# Patient Record
Sex: FEMALE | Race: WHITE | NOT HISPANIC OR LATINO | Employment: OTHER | URBAN - METROPOLITAN AREA
[De-identification: names, ages, dates, MRNs, and addresses within clinical notes are randomized per-mention and may not be internally consistent; named-entity substitution may affect disease eponyms.]

---

## 2019-06-06 ENCOUNTER — DOCUMENTATION ONLY (OUTPATIENT)
Dept: OTHER | Facility: CLINIC | Age: 69
End: 2019-06-06

## 2020-09-30 DIAGNOSIS — Z11.59 ENCOUNTER FOR SCREENING FOR OTHER VIRAL DISEASES: Primary | ICD-10-CM

## 2020-09-30 PROBLEM — C54.1 ENDOMETRIAL CARCINOMA (H): Status: ACTIVE | Noted: 2020-09-30

## 2020-09-30 PROBLEM — G47.30 SLEEP APNEA IN ADULT: Status: ACTIVE | Noted: 2020-09-30

## 2020-09-30 PROBLEM — M26.609 TMJ (TEMPOROMANDIBULAR JOINT DISORDER): Status: ACTIVE | Noted: 2020-09-30

## 2020-10-07 ENCOUNTER — TRANSFERRED RECORDS (OUTPATIENT)
Dept: HEALTH INFORMATION MANAGEMENT | Facility: CLINIC | Age: 70
End: 2020-10-07

## 2020-10-12 ENCOUNTER — APPOINTMENT (OUTPATIENT)
Dept: NURSING | Facility: CLINIC | Age: 70
End: 2020-10-12
Payer: COMMERCIAL

## 2020-10-12 DIAGNOSIS — Z01.818 PREOP GENERAL PHYSICAL EXAM: Primary | ICD-10-CM

## 2020-10-12 DIAGNOSIS — Z11.59 ENCOUNTER FOR SCREENING FOR OTHER VIRAL DISEASES: ICD-10-CM

## 2020-10-12 PROCEDURE — 93000 ELECTROCARDIOGRAM COMPLETE: CPT | Performed by: FAMILY MEDICINE

## 2020-10-12 PROCEDURE — U0003 INFECTIOUS AGENT DETECTION BY NUCLEIC ACID (DNA OR RNA); SEVERE ACUTE RESPIRATORY SYNDROME CORONAVIRUS 2 (SARS-COV-2) (CORONAVIRUS DISEASE [COVID-19]), AMPLIFIED PROBE TECHNIQUE, MAKING USE OF HIGH THROUGHPUT TECHNOLOGIES AS DESCRIBED BY CMS-2020-01-R: HCPCS | Performed by: OBSTETRICS & GYNECOLOGY

## 2020-10-13 LAB
SARS-COV-2 RNA SPEC QL NAA+PROBE: NOT DETECTED
SPECIMEN SOURCE: NORMAL

## 2020-10-14 ENCOUNTER — TELEPHONE (OUTPATIENT)
Dept: NURSING | Facility: CLINIC | Age: 70
End: 2020-10-14

## 2020-10-14 NOTE — TELEPHONE ENCOUNTER
Dr. Rubén carrasco. Says she did preop on patient and patient needed an EKG done so she faxed orders over to [facility] to have patient get it done. Needs results faxed to [phone].

## 2020-10-14 NOTE — PROGRESS NOTES
PTA medications updated by Medication Scribe prior to surgery via phone call with patient      -LAST DOSES ENTERED BY NURSE-    Comments:    Medication history sources: Patient, Surescripts, H&P and Patient's home med list  Medication history source reliability: Good  Adherence assessment: N/A Not Observed    Significant changes made to the medication list:  None      Additional medication history information:   None        Prior to Admission medications    Medication Sig Last Dose Taking? Auth Provider   Carboxymethylcellul-Glycerin (REFRESH RELIEVA PF OP) Apply 2 drops to eye At Bedtime   at HS Yes Reported, Patient   melatonin 1 MG TABS tablet Take 1 mg by mouth At Bedtime  at HS Yes Reported, Patient   ZINC PICOLINATE PO Take 30 mg by mouth daily 1 WEEK AGO Yes Reported, Patient

## 2020-10-14 NOTE — PROGRESS NOTES
Made multiple attempts to reach patient; unable to connect with patient to review medications prior to surgery. Patient did mail in a medication list; I have updated her PTA med list accordingly.

## 2020-10-15 ENCOUNTER — ANESTHESIA EVENT (OUTPATIENT)
Dept: SURGERY | Facility: CLINIC | Age: 70
End: 2020-10-15
Payer: COMMERCIAL

## 2020-10-15 ENCOUNTER — ANESTHESIA (OUTPATIENT)
Dept: SURGERY | Facility: CLINIC | Age: 70
End: 2020-10-15
Payer: COMMERCIAL

## 2020-10-15 ENCOUNTER — HOSPITAL ENCOUNTER (OUTPATIENT)
Facility: CLINIC | Age: 70
Discharge: HOME | End: 2020-10-16
Attending: OBSTETRICS & GYNECOLOGY | Admitting: OBSTETRICS & GYNECOLOGY
Payer: COMMERCIAL

## 2020-10-15 DIAGNOSIS — C54.1 ENDOMETRIAL CARCINOMA (H): Primary | ICD-10-CM

## 2020-10-15 PROCEDURE — 88112 CYTOPATH CELL ENHANCE TECH: CPT | Mod: TC | Performed by: OBSTETRICS & GYNECOLOGY

## 2020-10-15 PROCEDURE — 999N001018 HC STATISTIC H-CELL BLOCK W/STAIN: Performed by: OBSTETRICS & GYNECOLOGY

## 2020-10-15 PROCEDURE — 88360 TUMOR IMMUNOHISTOCHEM/MANUAL: CPT | Mod: TC | Performed by: OBSTETRICS & GYNECOLOGY

## 2020-10-15 PROCEDURE — 250N000011 HC RX IP 250 OP 636: Performed by: NURSE PRACTITIONER

## 2020-10-15 PROCEDURE — 250N000011 HC RX IP 250 OP 636: Performed by: NURSE ANESTHETIST, CERTIFIED REGISTERED

## 2020-10-15 PROCEDURE — 761N000001 HC RECOVERY PHASE 1 LEVEL 1 FIRST HR: Performed by: OBSTETRICS & GYNECOLOGY

## 2020-10-15 PROCEDURE — 88305 TISSUE EXAM BY PATHOLOGIST: CPT | Mod: 26 | Performed by: PATHOLOGY

## 2020-10-15 PROCEDURE — 250N000009 HC RX 250: Performed by: NURSE ANESTHETIST, CERTIFIED REGISTERED

## 2020-10-15 PROCEDURE — 88307 TISSUE EXAM BY PATHOLOGIST: CPT | Mod: TC | Performed by: OBSTETRICS & GYNECOLOGY

## 2020-10-15 PROCEDURE — 88342 IMHCHEM/IMCYTCHM 1ST ANTB: CPT | Mod: TC,XU | Performed by: OBSTETRICS & GYNECOLOGY

## 2020-10-15 PROCEDURE — 88341 IMHCHEM/IMCYTCHM EA ADD ANTB: CPT | Mod: TC,XU | Performed by: OBSTETRICS & GYNECOLOGY

## 2020-10-15 PROCEDURE — 258N000001 HC RX 258: Performed by: OBSTETRICS & GYNECOLOGY

## 2020-10-15 PROCEDURE — 88309 TISSUE EXAM BY PATHOLOGIST: CPT | Mod: TC | Performed by: OBSTETRICS & GYNECOLOGY

## 2020-10-15 PROCEDURE — 250N000003 HC SEVOFLURANE, EA 15 MIN: Performed by: OBSTETRICS & GYNECOLOGY

## 2020-10-15 PROCEDURE — 250N000011 HC RX IP 250 OP 636: Performed by: OBSTETRICS & GYNECOLOGY

## 2020-10-15 PROCEDURE — 250N000013 HC RX MED GY IP 250 OP 250 PS 637: Performed by: NURSE PRACTITIONER

## 2020-10-15 PROCEDURE — 88112 CYTOPATH CELL ENHANCE TECH: CPT | Mod: 26 | Performed by: PATHOLOGY

## 2020-10-15 PROCEDURE — 999N000139 HC STATISTIC PRE-PROCEDURE ASSESSMENT II: Performed by: OBSTETRICS & GYNECOLOGY

## 2020-10-15 PROCEDURE — 258N000003 HC RX IP 258 OP 636: Performed by: NURSE ANESTHETIST, CERTIFIED REGISTERED

## 2020-10-15 PROCEDURE — 88309 TISSUE EXAM BY PATHOLOGIST: CPT | Mod: 26 | Performed by: PATHOLOGY

## 2020-10-15 PROCEDURE — 88341 IMHCHEM/IMCYTCHM EA ADD ANTB: CPT | Mod: 26 | Performed by: PATHOLOGY

## 2020-10-15 PROCEDURE — 88307 TISSUE EXAM BY PATHOLOGIST: CPT | Mod: 26 | Performed by: PATHOLOGY

## 2020-10-15 PROCEDURE — 250N000009 HC RX 250: Performed by: OBSTETRICS & GYNECOLOGY

## 2020-10-15 PROCEDURE — 88305 TISSUE EXAM BY PATHOLOGIST: CPT | Mod: TC | Performed by: OBSTETRICS & GYNECOLOGY

## 2020-10-15 PROCEDURE — 761N000002 HC RECOVERY PHASE 1 LEVEL 1 EA ADDTL HR: Performed by: OBSTETRICS & GYNECOLOGY

## 2020-10-15 PROCEDURE — 250N000006 HC OR RX SURGIFLO W/THROMBIN KIT 2ML 1991 OPNP: Performed by: OBSTETRICS & GYNECOLOGY

## 2020-10-15 PROCEDURE — 370N000001 HC ANESTHESIA TECHNICAL FEE, 1ST 30 MIN: Performed by: OBSTETRICS & GYNECOLOGY

## 2020-10-15 PROCEDURE — 272N000001 HC OR GENERAL SUPPLY STERILE: Performed by: OBSTETRICS & GYNECOLOGY

## 2020-10-15 PROCEDURE — 88342 IMHCHEM/IMCYTCHM 1ST ANTB: CPT | Mod: 26 | Performed by: PATHOLOGY

## 2020-10-15 PROCEDURE — 360N000068 HC SURGERY LEVEL 8 1ST 30 MIN: Performed by: OBSTETRICS & GYNECOLOGY

## 2020-10-15 PROCEDURE — 370N000002 HC ANESTHESIA TECHNICAL FEE, EACH ADDTL 15 MIN: Performed by: OBSTETRICS & GYNECOLOGY

## 2020-10-15 PROCEDURE — 360N000069 HC SURGERY LEVEL 8 EA 15 ADDTL MIN: Performed by: OBSTETRICS & GYNECOLOGY

## 2020-10-15 PROCEDURE — 250N000011 HC RX IP 250 OP 636: Performed by: ANESTHESIOLOGY

## 2020-10-15 RX ORDER — LIDOCAINE 40 MG/G
CREAM TOPICAL
Status: DISCONTINUED | OUTPATIENT
Start: 2020-10-15 | End: 2020-10-16 | Stop reason: HOSPADM

## 2020-10-15 RX ORDER — LABETALOL HYDROCHLORIDE 5 MG/ML
10 INJECTION, SOLUTION INTRAVENOUS
Status: DISCONTINUED | OUTPATIENT
Start: 2020-10-15 | End: 2020-10-15 | Stop reason: HOSPADM

## 2020-10-15 RX ORDER — SODIUM CHLORIDE, SODIUM LACTATE, POTASSIUM CHLORIDE, CALCIUM CHLORIDE 600; 310; 30; 20 MG/100ML; MG/100ML; MG/100ML; MG/100ML
INJECTION, SOLUTION INTRAVENOUS CONTINUOUS PRN
Status: DISCONTINUED | OUTPATIENT
Start: 2020-10-15 | End: 2020-10-15

## 2020-10-15 RX ORDER — SENNOSIDES A AND B 8.6 MG/1
1-3 TABLET, FILM COATED ORAL 2 TIMES DAILY PRN
Qty: 30 TABLET | Refills: 0 | Status: SHIPPED | OUTPATIENT
Start: 2020-10-15

## 2020-10-15 RX ORDER — LIDOCAINE HYDROCHLORIDE 20 MG/ML
INJECTION, SOLUTION INFILTRATION; PERINEURAL PRN
Status: DISCONTINUED | OUTPATIENT
Start: 2020-10-15 | End: 2020-10-15

## 2020-10-15 RX ORDER — FENTANYL CITRATE 50 UG/ML
25-50 INJECTION, SOLUTION INTRAMUSCULAR; INTRAVENOUS
Status: DISCONTINUED | OUTPATIENT
Start: 2020-10-15 | End: 2020-10-15 | Stop reason: HOSPADM

## 2020-10-15 RX ORDER — CIPROFLOXACIN 2 MG/ML
400 INJECTION, SOLUTION INTRAVENOUS SEE ADMIN INSTRUCTIONS
Status: DISCONTINUED | OUTPATIENT
Start: 2020-10-15 | End: 2020-10-15 | Stop reason: HOSPADM

## 2020-10-15 RX ORDER — ONDANSETRON 4 MG/1
4 TABLET, ORALLY DISINTEGRATING ORAL EVERY 6 HOURS PRN
Status: DISCONTINUED | OUTPATIENT
Start: 2020-10-15 | End: 2020-10-16 | Stop reason: HOSPADM

## 2020-10-15 RX ORDER — ACETAMINOPHEN 325 MG/1
650 TABLET ORAL EVERY 4 HOURS PRN
Status: DISCONTINUED | OUTPATIENT
Start: 2020-10-15 | End: 2020-10-16 | Stop reason: HOSPADM

## 2020-10-15 RX ORDER — KETOROLAC TROMETHAMINE 15 MG/ML
15 INJECTION, SOLUTION INTRAMUSCULAR; INTRAVENOUS EVERY 6 HOURS
Status: COMPLETED | OUTPATIENT
Start: 2020-10-15 | End: 2020-10-16

## 2020-10-15 RX ORDER — GLYCOPYRROLATE 0.2 MG/ML
INJECTION, SOLUTION INTRAMUSCULAR; INTRAVENOUS PRN
Status: DISCONTINUED | OUTPATIENT
Start: 2020-10-15 | End: 2020-10-15

## 2020-10-15 RX ORDER — NYSTATIN 100000 U/G
CREAM TOPICAL 2 TIMES DAILY
Status: DISCONTINUED | OUTPATIENT
Start: 2020-10-15 | End: 2020-10-16 | Stop reason: HOSPADM

## 2020-10-15 RX ORDER — DEXAMETHASONE SODIUM PHOSPHATE 4 MG/ML
INJECTION, SOLUTION INTRA-ARTICULAR; INTRALESIONAL; INTRAMUSCULAR; INTRAVENOUS; SOFT TISSUE PRN
Status: DISCONTINUED | OUTPATIENT
Start: 2020-10-15 | End: 2020-10-15

## 2020-10-15 RX ORDER — BUPIVACAINE HYDROCHLORIDE AND EPINEPHRINE 5; 5 MG/ML; UG/ML
INJECTION, SOLUTION PERINEURAL PRN
Status: DISCONTINUED | OUTPATIENT
Start: 2020-10-15 | End: 2020-10-15 | Stop reason: HOSPADM

## 2020-10-15 RX ORDER — MICONAZOLE NITRATE 20 MG/G
CREAM TOPICAL
Status: DISCONTINUED | OUTPATIENT
Start: 2020-10-15 | End: 2020-10-16 | Stop reason: HOSPADM

## 2020-10-15 RX ORDER — NEOSTIGMINE METHYLSULFATE 1 MG/ML
VIAL (ML) INJECTION PRN
Status: DISCONTINUED | OUTPATIENT
Start: 2020-10-15 | End: 2020-10-15

## 2020-10-15 RX ORDER — NALOXONE HYDROCHLORIDE 0.4 MG/ML
.1-.4 INJECTION, SOLUTION INTRAMUSCULAR; INTRAVENOUS; SUBCUTANEOUS
Status: ACTIVE | OUTPATIENT
Start: 2020-10-15 | End: 2020-10-16

## 2020-10-15 RX ORDER — OXYCODONE HYDROCHLORIDE 5 MG/1
5 TABLET ORAL EVERY 6 HOURS PRN
Qty: 10 TABLET | Refills: 0 | Status: SHIPPED | OUTPATIENT
Start: 2020-10-15 | End: 2020-10-18

## 2020-10-15 RX ORDER — PROCHLORPERAZINE MALEATE 5 MG
5 TABLET ORAL EVERY 6 HOURS PRN
Status: DISCONTINUED | OUTPATIENT
Start: 2020-10-15 | End: 2020-10-16 | Stop reason: HOSPADM

## 2020-10-15 RX ORDER — ACETAMINOPHEN 325 MG/1
975 TABLET ORAL ONCE
Status: DISCONTINUED | OUTPATIENT
Start: 2020-10-15 | End: 2020-10-15 | Stop reason: HOSPADM

## 2020-10-15 RX ORDER — ONDANSETRON 4 MG/1
4 TABLET, ORALLY DISINTEGRATING ORAL EVERY 30 MIN PRN
Status: DISCONTINUED | OUTPATIENT
Start: 2020-10-15 | End: 2020-10-15 | Stop reason: HOSPADM

## 2020-10-15 RX ORDER — ONDANSETRON 2 MG/ML
4 INJECTION INTRAMUSCULAR; INTRAVENOUS EVERY 30 MIN PRN
Status: DISCONTINUED | OUTPATIENT
Start: 2020-10-15 | End: 2020-10-15 | Stop reason: HOSPADM

## 2020-10-15 RX ORDER — SODIUM CHLORIDE, SODIUM LACTATE, POTASSIUM CHLORIDE, CALCIUM CHLORIDE 600; 310; 30; 20 MG/100ML; MG/100ML; MG/100ML; MG/100ML
INJECTION, SOLUTION INTRAVENOUS CONTINUOUS
Status: DISCONTINUED | OUTPATIENT
Start: 2020-10-15 | End: 2020-10-15 | Stop reason: HOSPADM

## 2020-10-15 RX ORDER — HYDROMORPHONE HYDROCHLORIDE 1 MG/ML
.3-.5 INJECTION, SOLUTION INTRAMUSCULAR; INTRAVENOUS; SUBCUTANEOUS EVERY 5 MIN PRN
Status: DISCONTINUED | OUTPATIENT
Start: 2020-10-15 | End: 2020-10-15 | Stop reason: HOSPADM

## 2020-10-15 RX ORDER — CIPROFLOXACIN 2 MG/ML
400 INJECTION, SOLUTION INTRAVENOUS
Status: DISCONTINUED | OUTPATIENT
Start: 2020-10-15 | End: 2020-10-15 | Stop reason: HOSPADM

## 2020-10-15 RX ORDER — ONDANSETRON 2 MG/ML
INJECTION INTRAMUSCULAR; INTRAVENOUS PRN
Status: DISCONTINUED | OUTPATIENT
Start: 2020-10-15 | End: 2020-10-15

## 2020-10-15 RX ORDER — ONDANSETRON 2 MG/ML
4 INJECTION INTRAMUSCULAR; INTRAVENOUS EVERY 6 HOURS PRN
Status: DISCONTINUED | OUTPATIENT
Start: 2020-10-15 | End: 2020-10-16 | Stop reason: HOSPADM

## 2020-10-15 RX ORDER — PROPOFOL 10 MG/ML
INJECTION, EMULSION INTRAVENOUS PRN
Status: DISCONTINUED | OUTPATIENT
Start: 2020-10-15 | End: 2020-10-15

## 2020-10-15 RX ORDER — PROPOFOL 10 MG/ML
INJECTION, EMULSION INTRAVENOUS CONTINUOUS PRN
Status: DISCONTINUED | OUTPATIENT
Start: 2020-10-15 | End: 2020-10-15

## 2020-10-15 RX ORDER — HYDRALAZINE HYDROCHLORIDE 20 MG/ML
2.5-5 INJECTION INTRAMUSCULAR; INTRAVENOUS EVERY 10 MIN PRN
Status: DISCONTINUED | OUTPATIENT
Start: 2020-10-15 | End: 2020-10-15 | Stop reason: HOSPADM

## 2020-10-15 RX ORDER — NALOXONE HYDROCHLORIDE 0.4 MG/ML
.1-.4 INJECTION, SOLUTION INTRAMUSCULAR; INTRAVENOUS; SUBCUTANEOUS
Status: DISCONTINUED | OUTPATIENT
Start: 2020-10-15 | End: 2020-10-16 | Stop reason: HOSPADM

## 2020-10-15 RX ORDER — OXYCODONE HYDROCHLORIDE 5 MG/1
5-10 TABLET ORAL
Status: DISCONTINUED | OUTPATIENT
Start: 2020-10-15 | End: 2020-10-16 | Stop reason: HOSPADM

## 2020-10-15 RX ORDER — FENTANYL CITRATE 50 UG/ML
INJECTION, SOLUTION INTRAMUSCULAR; INTRAVENOUS PRN
Status: DISCONTINUED | OUTPATIENT
Start: 2020-10-15 | End: 2020-10-15

## 2020-10-15 RX ORDER — MAGNESIUM HYDROXIDE 1200 MG/15ML
LIQUID ORAL PRN
Status: DISCONTINUED | OUTPATIENT
Start: 2020-10-15 | End: 2020-10-15 | Stop reason: HOSPADM

## 2020-10-15 RX ORDER — INDOCYANINE GREEN AND WATER 25 MG
KIT INJECTION PRN
Status: DISCONTINUED | OUTPATIENT
Start: 2020-10-15 | End: 2020-10-15 | Stop reason: HOSPADM

## 2020-10-15 RX ADMIN — OXYCODONE HYDROCHLORIDE 5 MG: 5 TABLET ORAL at 12:24

## 2020-10-15 RX ADMIN — PROPOFOL 20 MCG/KG/MIN: 10 INJECTION, EMULSION INTRAVENOUS at 08:47

## 2020-10-15 RX ADMIN — KETOROLAC TROMETHAMINE 15 MG: 15 INJECTION, SOLUTION INTRAMUSCULAR; INTRAVENOUS at 16:29

## 2020-10-15 RX ADMIN — OXYCODONE HYDROCHLORIDE 5 MG: 5 TABLET ORAL at 21:17

## 2020-10-15 RX ADMIN — NEOSTIGMINE METHYLSULFATE 5 MG: 1 INJECTION, SOLUTION INTRAVENOUS at 09:57

## 2020-10-15 RX ADMIN — ROCURONIUM BROMIDE 5 MG: 10 INJECTION INTRAVENOUS at 09:16

## 2020-10-15 RX ADMIN — FENTANYL CITRATE 50 MCG: 50 INJECTION, SOLUTION INTRAMUSCULAR; INTRAVENOUS at 08:43

## 2020-10-15 RX ADMIN — PROPOFOL 50 MG: 10 INJECTION, EMULSION INTRAVENOUS at 09:48

## 2020-10-15 RX ADMIN — KETOROLAC TROMETHAMINE 15 MG: 15 INJECTION, SOLUTION INTRAMUSCULAR; INTRAVENOUS at 22:35

## 2020-10-15 RX ADMIN — DEXAMETHASONE SODIUM PHOSPHATE 4 MG: 4 INJECTION, SOLUTION INTRA-ARTICULAR; INTRALESIONAL; INTRAMUSCULAR; INTRAVENOUS; SOFT TISSUE at 08:41

## 2020-10-15 RX ADMIN — ONDANSETRON 4 MG: 2 INJECTION INTRAMUSCULAR; INTRAVENOUS at 09:48

## 2020-10-15 RX ADMIN — FENTANYL CITRATE 50 MCG: 50 INJECTION, SOLUTION INTRAMUSCULAR; INTRAVENOUS at 08:23

## 2020-10-15 RX ADMIN — DEXMEDETOMIDINE HYDROCHLORIDE 0.2 MCG/KG/HR: 100 INJECTION, SOLUTION INTRAVENOUS at 08:35

## 2020-10-15 RX ADMIN — GLYCOPYRROLATE 0.8 MG: 0.2 INJECTION, SOLUTION INTRAMUSCULAR; INTRAVENOUS at 09:57

## 2020-10-15 RX ADMIN — FENTANYL CITRATE 50 MCG: 0.05 INJECTION, SOLUTION INTRAMUSCULAR; INTRAVENOUS at 10:16

## 2020-10-15 RX ADMIN — CIPROFLOXACIN 400 MG: 2 INJECTION INTRAVENOUS at 08:38

## 2020-10-15 RX ADMIN — SODIUM CHLORIDE, POTASSIUM CHLORIDE, SODIUM LACTATE AND CALCIUM CHLORIDE: 600; 310; 30; 20 INJECTION, SOLUTION INTRAVENOUS at 08:22

## 2020-10-15 RX ADMIN — MIDAZOLAM 2 MG: 1 INJECTION INTRAMUSCULAR; INTRAVENOUS at 08:18

## 2020-10-15 RX ADMIN — GLYCOPYRROLATE 0.2 MG: 0.2 INJECTION, SOLUTION INTRAMUSCULAR; INTRAVENOUS at 09:22

## 2020-10-15 RX ADMIN — KETOROLAC TROMETHAMINE 15 MG: 15 INJECTION, SOLUTION INTRAMUSCULAR; INTRAVENOUS at 10:44

## 2020-10-15 RX ADMIN — LIDOCAINE HYDROCHLORIDE 100 MG: 20 INJECTION, SOLUTION INFILTRATION; PERINEURAL at 08:25

## 2020-10-15 RX ADMIN — PROPOFOL 200 MG: 10 INJECTION, EMULSION INTRAVENOUS at 08:25

## 2020-10-15 RX ADMIN — METRONIDAZOLE 500 MG: 500 INJECTION, SOLUTION INTRAVENOUS at 08:34

## 2020-10-15 RX ADMIN — ROCURONIUM BROMIDE 50 MG: 10 INJECTION INTRAVENOUS at 08:25

## 2020-10-15 RX ADMIN — ACETAMINOPHEN 650 MG: 325 TABLET, FILM COATED ORAL at 18:54

## 2020-10-15 RX ADMIN — ONDANSETRON 4 MG: 4 TABLET, ORALLY DISINTEGRATING ORAL at 21:17

## 2020-10-15 RX ADMIN — HYDROMORPHONE HYDROCHLORIDE 0.5 MG: 1 INJECTION, SOLUTION INTRAMUSCULAR; INTRAVENOUS; SUBCUTANEOUS at 11:00

## 2020-10-15 ASSESSMENT — MIFFLIN-ST. JEOR: SCORE: 1670.98

## 2020-10-15 ASSESSMENT — LIFESTYLE VARIABLES: TOBACCO_USE: 1

## 2020-10-15 NOTE — ANESTHESIA PREPROCEDURE EVALUATION
Anesthesia Pre-Procedure Evaluation    Patient: Juanita Aggarwal   MRN: [id number] : 1950          Preoperative Diagnosis: Endometrial cancer (H) [C54.1]  BMI 39.0-39.9,adult [Z68.39]    Procedure(s):  ROBOTIC-ASSISTED TOTAL LAPAROSCOPIC HYSTERECTOMY, BILATERAL SALPINGO-OOPHORECTOMY, WASHING, SENTINEL LYMPHNODE BIOPSY WITH FIREFLY, POSSIBLE PARAAORTIC LYMPHADENECTOMY    Past Medical History:   Diagnosis Date     Arthritis      Depression      Fibromyalgia      Restless legs syndrome      Sleep apnea      Past Surgical History:   Procedure Laterality Date     ENT SURGERY       EYE SURGERY         Anesthesia Evaluation     . Pt has had prior anesthetic. Type: General    No history of anesthetic complications          ROS/MED HX    ENT/Pulmonary:     (+)sleep apnea, other ENT- TMJ disorder, tobacco use, Past use uses CPAP , . .    Neurologic:       Cardiovascular:     (+) ----. : . . . :. . Previous cardiac testing date:results:date: results:ECG reviewed date:10/14/20 results:NSR date: results:          METS/Exercise Tolerance:  >4 METS   Hematologic:         Musculoskeletal:   (+) arthritis,  -       GI/Hepatic:         Renal/Genitourinary:         Endo:     (+) Obesity (Class 3), .      Psychiatric:     (+) psychiatric history other (comment) and depression (Fibromyalgia)      Infectious Disease:         Malignancy:   (+) Malignancy History of Other  Other CA Endometrial status post         Other:                                 Lab Results   Component Value Date    WBC 11.6 (H) 2016    HGB 13.3 2016    HCT 39.0 2016     2016     2016    POTASSIUM 3.9 2016    CHLORIDE 110 (H) 2016    CO2 21 2016    BUN 15 2016    CR 0.83 2016     (H) 2016    BENTON 9.0 2016    ALBUMIN 3.7 2016    PROTTOTAL 7.0 2016    ALT 24 2016    AST 21 2016    ALKPHOS 85 2016    BILITOTAL 0.5 2016    LIPASE 90  "06/08/2016       Preop Vitals  BP Readings from Last 3 Encounters:   06/08/16 142/87    Pulse Readings from Last 3 Encounters:   06/08/16 59      Resp Readings from Last 3 Encounters:   06/08/16 18    SpO2 Readings from Last 3 Encounters:   06/08/16 99%      Temp Readings from Last 1 Encounters:   06/08/16 36.8  C (98.2  F) (Oral)    Ht Readings from Last 1 Encounters:   06/08/16 1.702 m (5' 7\")      Wt Readings from Last 1 Encounters:   06/08/16 103 kg (227 lb)    Estimated body mass index is 35.55 kg/m  as calculated from the following:    Height as of 6/8/16: 1.702 m (5' 7\").    Weight as of 6/8/16: 103 kg (227 lb).       Anesthesia Plan      History & Physical Review  History and physical reviewed and following examination; no interval change.    ASA Status:  3 .    NPO Status:  > 8 hours    Plan for General with Intravenous and Propofol induction. Maintenance will be Balanced.    PONV prophylaxis:  Ondansetron (or other 5HT-3) and Dexamethasone or Solumedrol  Additional equipment: Videolaryngoscope Precedex gtt        Postoperative Care  Postoperative pain management:  IV analgesics and Multi-modal analgesia.      Consents  Anesthetic plan, risks, benefits and alternatives discussed with:  Patient..                 Wally Ty MD  "

## 2020-10-15 NOTE — OR NURSING
Patient has reddened groin area.  Patient stated she was told by her primary that she has a fungal infection.  Patient states she wants the hospital to provide a ride home from the hospital when she is discharged.  Patient refuses to give a  for Dr Gama to call.

## 2020-10-15 NOTE — BRIEF OP NOTE
[facility]    Brief Operative Note    Pre-operative diagnosis: Endometrial cancer (H) [C54.1]  BMI 39.0-39.9,adult [Z68.39]  Post-operative diagnosis Same as pre-operative diagnosis    Procedure: Procedure(s):  ROBOTIC-ASSISTED TOTAL LAPAROSCOPIC HYSTERECTOMY, BILATERAL SALPINGO-OOPHORECTOMY, WASHING, BILATERAL SENTINEL LYMPHNODE BIOPSY WITH FIREFLY,  Repair laceration vagina  Surgeon: Surgeon(s) and Role:     * Emily Gama MD - Primary  Anesthesia: General   Estimated blood loss: Minimal  Drains: None  Specimens:   ID Type Source Tests Collected by Time Destination   1 : Pelvic Washings Washings Pelvis CYTOLOGY NON GYN Emily Gama MD 10/15/2020  9:40 AM    A : RIGHT SENTINEL LYMPH  NODE Tissue Lymph Node, [place] SURGICAL PATHOLOGY EXAM Emily Gama MD 10/15/2020  9:03 AM    B : LEFT SENTINEL LYMPH  NODE Tissue Lymph Node, [place] SURGICAL PATHOLOGY EXAM Emily Gama MD 10/15/2020  9:17 AM    C : uterus, cervix, bilateral fallopian tubes, and bilateral ovaries Tissue Uterus with Bilateral Ovaries and Fallopian Tubes SURGICAL PATHOLOGY EXAM Emily Gama MD 10/15/2020  9:30 AM      Findings:   Large uterus making delivery difficult.   Complications: None.  Implants: * No implants in log *

## 2020-10-15 NOTE — DISCHARGE SUMMARY
"HOSPITAL DISCHARGE SUMMARY    Patient Name: Juanita Aggarwal  YOB: 1950 Age: 70 year old  Medical Record Number: [id number]  Primary Physician: Alanis Zuleta  Phone: [phone]  Admission Date: 10/15/2020  Discharge Date: 10/16/20    Juanita Aggarwal  will be discharged from [facility] to Home.    PRINCIPAL DISCHARGE DIAGNOSIS: Endometrial cancer     BRIEF HOSPITAL COURSE: This 70 year old female admitted following the below listed procedure. She tolerated the procedure well. Uneventful post operative course and discharge to home on POD #1 with adequate pain control, tolerating orals, voiding and ambulating.    PROCEDURES PERFORMED DURING HOSPITALIZATION:   Robotic assisted laparoscopic  Total hysterectomy  Bilateral salpingo-oophorectomy  Bilateral [place] [address] dissection    COMPLICATIONS IN HOSPITAL: None    CONSULTATIONS:  ERIKA    PERTINENT FINDINGS/RESULTS AT DISCHARGE:   BP (!) 141/90   Temp 98.2  F (36.8  C) (Tympanic)   Resp 16   Ht 1.689 m (5' 6.5\")   Wt 112.1 kg (247 lb 3.2 oz)   SpO2 95%   Breastfeeding No   BMI 39.30 kg/m      Latest Laboratory Results:  Chem:  CBC RESULTS:   Recent Labs   Lab Test 06/08/16  1935   WBC 11.6*   RBC 4.51   HGB 13.3   HCT 39.0   MCV 87   MCH 29.5   MCHC 34.1   RDW 13.7        Last Basic Metabolic Panel:  Lab Results   Component Value Date     06/08/2016      Lab Results   Component Value Date    POTASSIUM 3.9 06/08/2016     Lab Results   Component Value Date    CHLORIDE 110 06/08/2016     Lab Results   Component Value Date    BENTON 9.0 06/08/2016     Lab Results   Component Value Date    CO2 21 06/08/2016     Lab Results   Component Value Date    BUN 15 06/08/2016     Lab Results   Component Value Date    CR 0.83 06/08/2016     Lab Results   Component Value Date     06/08/2016         IMPORTANT PENDING TEST RESULTS:  Pathology    CONDITION AT DISCHARGE:    Stabilized    DISCHARGE ORDERS  Current Discharge Medication List    "   START taking these medications    Details   oxyCODONE (ROXICODONE) 5 MG tablet Take 1 tablet (5 mg) by mouth every 6 hours as needed for pain  Qty: 10 tablet, Refills: 0    Associated Diagnoses: Endometrial carcinoma (H)      senna (SENOKOT) 8.6 MG tablet Take 1-3 tablets by mouth 2 times daily as needed for constipation  Qty: 30 tablet, Refills: 0    Associated Diagnoses: Endometrial carcinoma (H)         CONTINUE these medications which have NOT CHANGED    Details   Carboxymethylcellul-Glycerin (REFRESH RELIEVA PF OP) Apply 2 drops to eye At Bedtime       melatonin 1 MG TABS tablet Take 1 mg by mouth At Bedtime      ZINC PICOLINATE PO Take 30 mg by mouth daily             DISCHARGE INSTRUCTION.      FOLLOW-UP: Juanita Aggarwal should see Alanis Zuleta.    Specialty follow-up: as above.     AFTER HOSPITAL RECOMMENDATIONS  As above.      Physician(s) in addition to primary physician who should receive a copy:  Alanis Zuleta, MARIE CNP

## 2020-10-15 NOTE — ANESTHESIA POSTPROCEDURE EVALUATION
Patient: Juanita Aggarwal    Procedure(s):  ROBOTIC-ASSISTED TOTAL LAPAROSCOPIC HYSTERECTOMY, BILATERAL SALPINGO-OOPHORECTOMY, WASHING, BILATERAL SENTINEL LYMPHNODE BIOPSY WITH FIREFLY,  Repair laceration vagina    Diagnosis:Endometrial cancer (H) [C54.1]  BMI 39.0-39.9,adult [Z68.39]  Diagnosis Additional Information: No value filed.    Anesthesia Type:  General    Note:  Anesthesia Post Evaluation    Patient location during evaluation: PACU  Patient participation: Able to fully participate in evaluation  Level of consciousness: awake and alert  Pain management: adequate  Airway patency: patent  Cardiovascular status: acceptable  Respiratory status: acceptable and unassisted  Hydration status: acceptable  PONV: none             Last vitals:  Vitals:    10/15/20 0739 10/15/20 0742 10/15/20 1007   BP: (!) 141/90     Resp: 16     Temp: 36.8  C (98.2  F)  35.7  C (96.3  F)   SpO2:  95%          Electronically Signed By: Julissa rPather MD  October 15, 2020  10:25 AM

## 2020-10-15 NOTE — OP NOTE
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      SURGEON:  IKER Gama MD      ASSISTANT:  LUPIS Can.      ANESTHESIA:  General endotracheal anesthesia.      PROCEDURE:  Robotic-assisted total laparoscopic hysterectomy, bilateral salpingo-oophorectomy, washings, bilateral sentinel lymph node mapping and bilateral sentinel lymph node biopsies.        INDICATIONS FOR THE PROCEDURE:  The patient is a 70-year-old female who in 2019 was evaluated by Dr. Andrews for postmenopausal bleeding.  A cervical polyp was identified and removed.  An endometrial biopsy was offered, but the patient declined.  A pelvic ultrasound was ordered, but the patient did not follow-through and get it done.  On 09/09/2020, she presented to Dr. Tello with complaints of postmenopausal bleeding intermittently over the last year.  She reported daily bleeding for 2 months prior to her visit.  An endometrial biopsy was performed for a moderate amount of tissue.  Pathology revealed FIGO grade 2 endometrioid adenocarcinoma of the endometrium.  A transvaginal pelvic ultrasound on 09/09/2020 revealed a slightly enlarged uterus with an endometrial thickness of 31.8 mm and a left anterior subserosal fibroid measuring 1.7.  Ovaries were not visualized.      INTRAOPERATIVE FINDINGS:  The patient did have evidence of a cervical polyp present.  She had a slightly enlarged uterus.  There was no gross extrauterine spread.  She did map bilateral pelvic sentinel lymph nodes medial to the proximal external iliac vein.  These were not pathologically enlarged.      PROCEDURE IN DETAIL:  The patient was taken to the operating room.  She received broad-spectrum antibiotic prophylaxis.  Compression devices were placed on her lower extremities.  She was placed in the supine position on a pink pad on the  operating room table.  General endotracheal anesthesia was administered in the usual fashion.  Once intubated, she was repositioned in the low lithotomy position using well-padded Yellofin stirrups.  Her arms were padded and held at her side with draw sheets around her hands and her arms.  Shoulder braces were applied to her shoulders.  A Pinon was placed above her forehead.  She was prepped and draped in the usual sterile fashion.  A timeout was conducted and everyone agreed upon the procedure.        I started by inserting a Graves speculum into the vagina and visualizing the cervix.  It was grasped at 12 o'clock with a single-tooth tenaculum.  I injected a diluted ICG dye that had been diluted with 20 mL of water.  This was injected at 3 o'clock and 9 o'clock at a 1 cm depth into the cervical stroma and also submucosally, a milliliter was deposited in each of those areas.  I also placed a milliliter in the posterior cervix at 6 o'clock.  The speculum and tenaculum were removed.  The EEA sizer was placed in her vagina.  I then changed my gloves and we began placing the intra-abdominal trocars.  I infiltrated the areas that I had marked out at approximately 22 cm above the symphysis pubis in the midline with 0.5% Marcaine with epinephrine.  A small nick was made in the skin with a #15 scalpel blade.  A Veress needle was easily introduced into the peritoneal cavity.  Opening pressure was 3-4 mmHg.  The abdomen was insufflated with carbon dioxide to create a diffuse pneumoperitoneum.  Pressure limits were set initially at 15 mmHg for port placement.  Once we started the case, the pressure limits were dropped down to 10 mmHg.  Once we had established a pneumoperitoneum, the Veress needle was removed and replaced with an 8 mm da Iris trocar.  The camera was then introduced confirming intraperitoneal position and showing adhesions in the cecum to the right lower quadrant.  Under direct visualization, 3 additional da  Iris ports were placed.  I placed an 8 mm da Iris port 8 cm to the right of the camera port in the upper abdomen, and then two 8 mm da Iris ports were placed 8 cm and 16 cm to the left of the camera port in the upper abdomen.        She was then placed in steep Trendelenburg at 33-34 degrees.  This created gravitational displacement of the bowel into the upper abdomen.  The robot was then docked.  Monopolar scissors were placed in the right upper robotic arm, a [place] bipolar was placed in the medial left upper robotic arm and a ProGrasp was placed in the lateral left robotic arm.  These instruments were advanced into the pelvis under direct visualization.  With this, I took down the cecal adhesions.  Lorin Treviño was at the patient's bedside at that point and placed an 8 mm trocar in the right lower quadrant above the right anterior superior iliac spine.  We went to AirSeal mode at that point.        At this point, we started the robotic surgical portion of the case.  The right round ligament was elevated with a ProGrasp, was cauterized with the [place] bipolar and divided with the monopolar scissors.  I then opened up the posterior broad ligament peritoneum, I isolated the ovarian vessels by creating a defect underneath them and extending it towards the uterus.  They were cauterized at the pelvic brim with the [place] bipolar and divided with the monopolar scissors.  I then opened up the pararectal and paravesical spaces, identified the uterine artery and the ureter.  The uterine artery was cauterized at its origin.  I turned on the Firefly apparatus and did map a sentinel lymph node to the right side along the medial aspect of the proximal external iliac vein.  This was biopsied and taken out with a spoon grasper through the accessory port site.  I then divided the vesicouterine peritoneum and went to the left side.  The left round ligament was elevated.  It was cauterized with the Maryland bipolar and  divided with the monopolar scissors.  I took down physiologic adhesions of the sigmoid colon to the right pelvic sidewall.  We opened up the posterior broad ligament peritoneum lateral to the ovarian vessels.  The ovarian vessels were again isolated by creating a defect below them and extending it towards the uterus and then they were cauterized at the pelvic brim with the Maryland bipolar and divided with the monopolar scissors again.  The pararectal and paravesical spaces were opened.  The uterine artery and ureter were identified.  The uterine artery was cauterized at the pelvic brim.  I turned on the Firefly apparatus on this side and a similar lymph node mapped to the left side.  This was biopsied off and taken out with a spoon grasper through the accessory port site.  She did have a little bit of oozing from the retroperitoneal space on the left-hand side.  The Ray-Johnathan was left in the retroperitoneal space while we continued with the hysterectomy.  The bladder was elevated where we had divided the vesicouterine peritoneum and the bladder was taken down off the anterior surface of the cervix and upper vagina quite easily.  I then started on the left-hand side.  The soft tissues at the isthmus were cauterized with the Maryland bipolar and divided with the monopolar scissors.  We then made our way down the cardinal ligament, cauterizing and dividing the soft tissues free of the cervix down to and including the uterosacral ligament.  We then repeated this on the right-hand side.  On the left-hand side, I had entered the vagina and once both cardinal ligaments were taken, I extended this vaginal incision circumferentially dividing the cervix free of the vagina under direct visualization.      Lorin Treviño then brought a 15 mm [place] bag through the colpotomy.  It was opened and I laid the uterus, cervix, tubes and ovaries within it.  It was cinched down and removed through the vagina.  An EEA sizer was then placed in  the vagina.  Using a large da Iris needle  and the Maryland bipolar with the ProGrasp holding up the bladder flap, we closed the vaginal cuff with 0 PDS suture anchored at 3 and 9 o'clock and we did a full-thickness anterior to posterior vaginal closure towards the midline from either side and the 2 sutures were tied together in the midline.  This did incorporate the cul-de-sac peritoneum in that closure.  The Ray-Johnathan was now removed and the saline was instilled into the pelvis and then re-aspirated and sent off as washings.  I used Surgiflo on the areas where we had taken the cardinal ligament.  I also placed it in the left retroperitoneal space.        At that point, the robotic instruments were removed.  The robot was undocked.  The pneumoperitoneum was allowed to dissipate and the trocars were removed intact.  The incision sites were closed with 4-0 Monocryl in an interrupted fashion to reapproximate the subcutaneous tissue and 4-0 Monocryl in a subcuticular fashion to reapproximate the skin.  Benzoin was placed around the incisions.  Steri-Strips were laid over the incision.  She did have a little oozing from the right lower quadrant and a pressure dressing was placed over this.  I then evaluated the vagina and she was found to have 3 small superficial lacerations.  These were repaired with 2-0 Vicryl suture in a running and interrupted fashion, depending on the size of the laceration.  Her anesthesia was then reversed.  She was extubated and taken to the recovery room in stable condition.      ESTIMATED BLOOD LOSS:  50 mL      Lorin Treviño was my primary assist for this case.  She helped me with all aspects of the case including trocar placement, docking of the robot and placement of the robotic instruments.  She assisted through the accessory port site, providing necessary countertraction and optimizing visualization.  She was instrumental in specimen retrieval.  She helped with cuff closure and eventual  undocking the robot and port site closure, as well as helping me with retraction so that I could do the vaginal laceration repair.         MADHU HENDERSON MD             D: 10/15/2020   T: 10/15/2020   MT: DAIANA      Name:     NANI DESHPANDE   MRN:      [id number]        Account:        [id number]   :      1950           Procedure Date: 10/15/2020      Document: [id number]       cc: Alanis Tello MD

## 2020-10-15 NOTE — PLAN OF CARE
Problem: Pain Acute  Goal: Acceptable Pain Control and Functional Ability  Outcome: Improving  Some HTN, all other VS WDL.  A/O,  ambulating SBA.  Voiding well in BR.  Regular diet, patient encouraged to take it slow, BS hypoactive, not passing gas, denies nausea.  Pain is well controlled with scheduled Toradol and PO 5mg oxycodone given x1.  Instructed on IS.  Plan for discharge home tomorrow, discharge medications locked in drawer.

## 2020-10-15 NOTE — OR NURSING
PNDS Criteria met.  Order received per Dr Prather to transfer to [facility] for continued recovery.  Hand-off report given to RN.  To 824-1 per cart with all belongings, including personal walker.  Patient does not use CPAP for ZITA.  Will transport with Capnography monitoring.

## 2020-10-15 NOTE — ANESTHESIA CARE TRANSFER NOTE
Patient: Juanita Aggarwal    Procedure(s):  ROBOTIC-ASSISTED TOTAL LAPAROSCOPIC HYSTERECTOMY, BILATERAL SALPINGO-OOPHORECTOMY, WASHING, BILATERAL SENTINEL LYMPHNODE BIOPSY WITH FIREFLY,  Repair laceration vagina    Diagnosis: Endometrial cancer (H) [C54.1]  BMI 39.0-39.9,adult [Z68.39]  Diagnosis Additional Information: No value filed.    Anesthesia Type:   General     Note:  Airway :Face Mask  Patient transferred to:PACU  Comments: Neuromuscular blockade reversed after TOF 4/4, spontaneous respirations, adequate tidal volumes, followed commands to voice, oropharynx suctioned with soft flexible catheter, extubated atraumatically, airway patent after extubation.  Oxygen via facemask at 8 liters per minute to PACU. Oxygen tubing connected to wall O2 in PACU, SpO2, NiBP, and EKG monitors and alarms on and functioning, Sherri Hugger warmer connected to patient gown, report on patient's clinical status given to PACU RN, RN questions answered.   Handoff Report: Identifed the Patient, Identified the Reponsible Provider, Reviewed the pertinent medical history, Discussed the surgical course, Reviewed Intra-OP anesthesia mangement and issues during anesthesia, Set expectations for post-procedure period and Allowed opportunity for questions and acknowledgement of understanding      Vitals: (Last set prior to Anesthesia Care Transfer)    CRNA VITALS  10/15/2020 0934 - 10/15/2020 1007      10/15/2020             Pulse:  86    SpO2:  98 %    Resp Rate (observed):  (!) 4    Resp Rate (set):  10                Electronically Signed By: MARIE Crocker CRNA  October 15, 2020  10:07 AM

## 2020-10-16 VITALS
BODY MASS INDEX: 38.8 KG/M2 | HEIGHT: 67 IN | WEIGHT: 247.2 LBS | RESPIRATION RATE: 16 BRPM | HEART RATE: 55 BPM | DIASTOLIC BLOOD PRESSURE: 66 MMHG | SYSTOLIC BLOOD PRESSURE: 128 MMHG | OXYGEN SATURATION: 95 % | TEMPERATURE: 97.3 F

## 2020-10-16 PROCEDURE — 250N000011 HC RX IP 250 OP 636: Performed by: NURSE PRACTITIONER

## 2020-10-16 PROCEDURE — 250N000013 HC RX MED GY IP 250 OP 250 PS 637: Performed by: NURSE PRACTITIONER

## 2020-10-16 RX ADMIN — OXYCODONE HYDROCHLORIDE 5 MG: 5 TABLET ORAL at 02:49

## 2020-10-16 RX ADMIN — OXYCODONE HYDROCHLORIDE 5 MG: 5 TABLET ORAL at 10:18

## 2020-10-16 RX ADMIN — KETOROLAC TROMETHAMINE 15 MG: 15 INJECTION, SOLUTION INTRAMUSCULAR; INTRAVENOUS at 05:31

## 2020-10-16 NOTE — CONSULTS
SW Consult Note:    D/I:  SW acknowledges consult for transportation.  Per bedside nurse, patient is going to call for a cab for transport home.  No further assistance needed.      P: Please reconsult social work for any additional needs.    CLAUDE Villa, LGSW  [phone]  [facility]

## 2020-10-16 NOTE — PLAN OF CARE
A&Ox4. VSS on RA. Ambulating SBA, walked halls this AM. Voiding well in BR. Regular diet, BS hypoactive, no BM but is passing gas, denies nausea.  Pain is well controlled with scheduled Toradol and PO 5mg oxycodone given x1. Plan for discharge home today, discharge medications locked in drawer.

## 2020-10-16 NOTE — DISCHARGE SUMMARY
Patient discharged at 3:00 PM to home. IV was discontinued. Pain at time of discharge was 2/10. Belongings returned to patient.  Discharge instructions and medications reviewed with patient.  Patient verbalized understanding and all questions were answered.  Prescriptions given to patient, pt declined senna.  At time of discharge, patient condition was stable and left the unit in wheelchair escorted by CNA.

## 2020-10-16 NOTE — PROVIDER NOTIFICATION
[id number]. A&Ox4. BP slightly elevated, other VSS on RA. Capno WDL. C/o abd pain, given scheduled Toradol and PRN oxycodone. C/o nausea, given Zofran. BS +, hypoactive in RUQ, not passing gas. PIV SL. Abd lap sites w/ steri strips WDL. R lap site w/ gauze tegaderm, small dried drainage; CDI. Scant vaginal bleeding, peripad in place. SBA + walker to BR, voiding. Plan: possible discharge 10/15.

## 2020-10-19 LAB
COPATH REPORT: NORMAL
COPATH REPORT: NORMAL

## 2020-10-27 DIAGNOSIS — Z11.59 ENCOUNTER FOR SCREENING FOR OTHER VIRAL DISEASES: Primary | ICD-10-CM

## 2020-11-07 DIAGNOSIS — Z11.59 ENCOUNTER FOR SCREENING FOR OTHER VIRAL DISEASES: ICD-10-CM

## 2020-11-07 PROCEDURE — 99000 SPECIMEN HANDLING OFFICE-LAB: CPT | Performed by: PATHOLOGY

## 2020-11-07 PROCEDURE — U0003 INFECTIOUS AGENT DETECTION BY NUCLEIC ACID (DNA OR RNA); SEVERE ACUTE RESPIRATORY SYNDROME CORONAVIRUS 2 (SARS-COV-2) (CORONAVIRUS DISEASE [COVID-19]), AMPLIFIED PROBE TECHNIQUE, MAKING USE OF HIGH THROUGHPUT TECHNOLOGIES AS DESCRIBED BY CMS-2020-01-R: HCPCS | Mod: 90 | Performed by: PATHOLOGY

## 2020-11-09 LAB
SARS-COV-2 RNA SPEC QL NAA+PROBE: NOT DETECTED
SPECIMEN SOURCE: NORMAL

## 2020-11-10 ENCOUNTER — ANESTHESIA EVENT (OUTPATIENT)
Dept: SURGERY | Facility: CLINIC | Age: 70
End: 2020-11-10
Payer: COMMERCIAL

## 2020-11-10 NOTE — OR NURSING
Pt states she has [employer] to transport her to and from hospital.  Pt states she had no one to stay with her overnight, instructed her we recommend she has someone to stay with her overnight.  Pt is calling Dr. Oswald's office to discuss this.  Pt will need medical transportation home if she is able to find someone to be with her overnight.

## 2020-11-11 ENCOUNTER — HOSPITAL ENCOUNTER (OUTPATIENT)
Facility: CLINIC | Age: 70
Discharge: HOME | End: 2020-11-11
Attending: THORACIC SURGERY (CARDIOTHORACIC VASCULAR SURGERY) | Admitting: THORACIC SURGERY (CARDIOTHORACIC VASCULAR SURGERY)
Payer: COMMERCIAL

## 2020-11-11 ENCOUNTER — APPOINTMENT (OUTPATIENT)
Dept: GENERAL RADIOLOGY | Facility: CLINIC | Age: 70
End: 2020-11-11
Attending: THORACIC SURGERY (CARDIOTHORACIC VASCULAR SURGERY)
Payer: COMMERCIAL

## 2020-11-11 ENCOUNTER — ANESTHESIA (OUTPATIENT)
Dept: SURGERY | Facility: CLINIC | Age: 70
End: 2020-11-11
Payer: COMMERCIAL

## 2020-11-11 VITALS
HEIGHT: 66 IN | DIASTOLIC BLOOD PRESSURE: 78 MMHG | BODY MASS INDEX: 39.41 KG/M2 | TEMPERATURE: 96.3 F | OXYGEN SATURATION: 96 % | RESPIRATION RATE: 16 BRPM | SYSTOLIC BLOOD PRESSURE: 132 MMHG | WEIGHT: 245.2 LBS | HEART RATE: 58 BPM

## 2020-11-11 PROCEDURE — 360N000015 HC SURGERY LEVEL 2 EA 15 ADDTL MIN: Performed by: THORACIC SURGERY (CARDIOTHORACIC VASCULAR SURGERY)

## 2020-11-11 PROCEDURE — 250N000009 HC RX 250: Performed by: NURSE ANESTHETIST, CERTIFIED REGISTERED

## 2020-11-11 PROCEDURE — 360N000018 HC SURGERY LEVEL 2 W FLUORO 1ST 30 MIN: Performed by: THORACIC SURGERY (CARDIOTHORACIC VASCULAR SURGERY)

## 2020-11-11 PROCEDURE — 250N000011 HC RX IP 250 OP 636: Performed by: NURSE ANESTHETIST, CERTIFIED REGISTERED

## 2020-11-11 PROCEDURE — 999N000063 XR CHEST PORT 1 VW

## 2020-11-11 PROCEDURE — 258N000003 HC RX IP 258 OP 636: Performed by: NURSE ANESTHETIST, CERTIFIED REGISTERED

## 2020-11-11 PROCEDURE — 272N000001 HC OR GENERAL SUPPLY STERILE: Performed by: THORACIC SURGERY (CARDIOTHORACIC VASCULAR SURGERY)

## 2020-11-11 PROCEDURE — 761N000001 HC RECOVERY PHASE 1 LEVEL 1 FIRST HR: Performed by: THORACIC SURGERY (CARDIOTHORACIC VASCULAR SURGERY)

## 2020-11-11 PROCEDURE — 250N000011 HC RX IP 250 OP 636: Performed by: THORACIC SURGERY (CARDIOTHORACIC VASCULAR SURGERY)

## 2020-11-11 PROCEDURE — 370N000001 HC ANESTHESIA TECHNICAL FEE, 1ST 30 MIN: Performed by: THORACIC SURGERY (CARDIOTHORACIC VASCULAR SURGERY)

## 2020-11-11 PROCEDURE — 761N000007 HC RECOVERY PHASE 2 EACH 15 MINS: Performed by: THORACIC SURGERY (CARDIOTHORACIC VASCULAR SURGERY)

## 2020-11-11 PROCEDURE — 999N000139 HC STATISTIC PRE-PROCEDURE ASSESSMENT II: Performed by: THORACIC SURGERY (CARDIOTHORACIC VASCULAR SURGERY)

## 2020-11-11 PROCEDURE — 370N000002 HC ANESTHESIA TECHNICAL FEE, EACH ADDTL 15 MIN: Performed by: THORACIC SURGERY (CARDIOTHORACIC VASCULAR SURGERY)

## 2020-11-11 PROCEDURE — 999N000179 XR SURGERY CARM FLUORO LESS THAN 5 MIN W STILLS

## 2020-11-11 PROCEDURE — 250N000009 HC RX 250: Performed by: THORACIC SURGERY (CARDIOTHORACIC VASCULAR SURGERY)

## 2020-11-11 PROCEDURE — C1788 PORT, INDWELLING, IMP: HCPCS | Performed by: THORACIC SURGERY (CARDIOTHORACIC VASCULAR SURGERY)

## 2020-11-11 PROCEDURE — 258N000003 HC RX IP 258 OP 636: Performed by: ANESTHESIOLOGY

## 2020-11-11 PROCEDURE — 250N000011 HC RX IP 250 OP 636: Performed by: PHYSICIAN ASSISTANT

## 2020-11-11 PROCEDURE — 258N000003 HC RX IP 258 OP 636: Performed by: THORACIC SURGERY (CARDIOTHORACIC VASCULAR SURGERY)

## 2020-11-11 DEVICE — CATH PORT POWERPORT CLEARVUE ISP 8FR 5608062: Type: IMPLANTABLE DEVICE | Site: CHEST  WALL | Status: FUNCTIONAL

## 2020-11-11 RX ORDER — ONDANSETRON 2 MG/ML
INJECTION INTRAMUSCULAR; INTRAVENOUS PRN
Status: DISCONTINUED | OUTPATIENT
Start: 2020-11-11 | End: 2020-11-11

## 2020-11-11 RX ORDER — HEPARIN SODIUM (PORCINE) LOCK FLUSH IV SOLN 100 UNIT/ML 100 UNIT/ML
SOLUTION INTRAVENOUS PRN
Status: DISCONTINUED | OUTPATIENT
Start: 2020-11-11 | End: 2020-11-11 | Stop reason: HOSPADM

## 2020-11-11 RX ORDER — PROPOFOL 10 MG/ML
INJECTION, EMULSION INTRAVENOUS CONTINUOUS PRN
Status: DISCONTINUED | OUTPATIENT
Start: 2020-11-11 | End: 2020-11-11

## 2020-11-11 RX ORDER — CEFAZOLIN SODIUM 2 G/100ML
2 INJECTION, SOLUTION INTRAVENOUS
Status: COMPLETED | OUTPATIENT
Start: 2020-11-11 | End: 2020-11-11

## 2020-11-11 RX ORDER — SODIUM CHLORIDE, SODIUM LACTATE, POTASSIUM CHLORIDE, CALCIUM CHLORIDE 600; 310; 30; 20 MG/100ML; MG/100ML; MG/100ML; MG/100ML
INJECTION, SOLUTION INTRAVENOUS CONTINUOUS
Status: DISCONTINUED | OUTPATIENT
Start: 2020-11-11 | End: 2020-11-11 | Stop reason: HOSPADM

## 2020-11-11 RX ORDER — SODIUM CHLORIDE, SODIUM LACTATE, POTASSIUM CHLORIDE, CALCIUM CHLORIDE 600; 310; 30; 20 MG/100ML; MG/100ML; MG/100ML; MG/100ML
INJECTION, SOLUTION INTRAVENOUS CONTINUOUS PRN
Status: DISCONTINUED | OUTPATIENT
Start: 2020-11-11 | End: 2020-11-11

## 2020-11-11 RX ORDER — CEFAZOLIN SODIUM 1 G/3ML
1 INJECTION, POWDER, FOR SOLUTION INTRAMUSCULAR; INTRAVENOUS SEE ADMIN INSTRUCTIONS
Status: DISCONTINUED | OUTPATIENT
Start: 2020-11-11 | End: 2020-11-11 | Stop reason: HOSPADM

## 2020-11-11 RX ORDER — FENTANYL CITRATE 50 UG/ML
25-50 INJECTION, SOLUTION INTRAMUSCULAR; INTRAVENOUS
Status: DISCONTINUED | OUTPATIENT
Start: 2020-11-11 | End: 2020-11-11 | Stop reason: HOSPADM

## 2020-11-11 RX ORDER — ONDANSETRON 4 MG/1
4 TABLET, ORALLY DISINTEGRATING ORAL EVERY 30 MIN PRN
Status: DISCONTINUED | OUTPATIENT
Start: 2020-11-11 | End: 2020-11-11 | Stop reason: HOSPADM

## 2020-11-11 RX ORDER — MEPERIDINE HYDROCHLORIDE 25 MG/ML
12.5 INJECTION INTRAMUSCULAR; INTRAVENOUS; SUBCUTANEOUS
Status: DISCONTINUED | OUTPATIENT
Start: 2020-11-11 | End: 2020-11-11 | Stop reason: HOSPADM

## 2020-11-11 RX ORDER — OXYCODONE HYDROCHLORIDE 5 MG/1
5 TABLET ORAL
Status: DISCONTINUED | OUTPATIENT
Start: 2020-11-11 | End: 2020-11-11 | Stop reason: HOSPADM

## 2020-11-11 RX ORDER — HYDROMORPHONE HYDROCHLORIDE 1 MG/ML
.3-.5 INJECTION, SOLUTION INTRAMUSCULAR; INTRAVENOUS; SUBCUTANEOUS EVERY 10 MIN PRN
Status: DISCONTINUED | OUTPATIENT
Start: 2020-11-11 | End: 2020-11-11 | Stop reason: HOSPADM

## 2020-11-11 RX ORDER — NALOXONE HYDROCHLORIDE 0.4 MG/ML
.1-.4 INJECTION, SOLUTION INTRAMUSCULAR; INTRAVENOUS; SUBCUTANEOUS
Status: DISCONTINUED | OUTPATIENT
Start: 2020-11-11 | End: 2020-11-11 | Stop reason: HOSPADM

## 2020-11-11 RX ORDER — BUPIVACAINE HYDROCHLORIDE 5 MG/ML
INJECTION, SOLUTION PERINEURAL PRN
Status: DISCONTINUED | OUTPATIENT
Start: 2020-11-11 | End: 2020-11-11 | Stop reason: HOSPADM

## 2020-11-11 RX ORDER — PROPOFOL 10 MG/ML
INJECTION, EMULSION INTRAVENOUS PRN
Status: DISCONTINUED | OUTPATIENT
Start: 2020-11-11 | End: 2020-11-11

## 2020-11-11 RX ORDER — ACETAMINOPHEN 325 MG/1
650 TABLET ORAL
Status: DISCONTINUED | OUTPATIENT
Start: 2020-11-11 | End: 2020-11-11 | Stop reason: HOSPADM

## 2020-11-11 RX ORDER — ONDANSETRON 2 MG/ML
4 INJECTION INTRAMUSCULAR; INTRAVENOUS EVERY 30 MIN PRN
Status: DISCONTINUED | OUTPATIENT
Start: 2020-11-11 | End: 2020-11-11 | Stop reason: HOSPADM

## 2020-11-11 RX ADMIN — PROPOFOL 10 MG: 10 INJECTION, EMULSION INTRAVENOUS at 08:38

## 2020-11-11 RX ADMIN — DEXMEDETOMIDINE HYDROCHLORIDE 20 MCG: 100 INJECTION, SOLUTION INTRAVENOUS at 07:58

## 2020-11-11 RX ADMIN — PROPOFOL 10 MG: 10 INJECTION, EMULSION INTRAVENOUS at 08:36

## 2020-11-11 RX ADMIN — SODIUM CHLORIDE, POTASSIUM CHLORIDE, SODIUM LACTATE AND CALCIUM CHLORIDE: 600; 310; 30; 20 INJECTION, SOLUTION INTRAVENOUS at 07:18

## 2020-11-11 RX ADMIN — PROPOFOL 10 MG: 10 INJECTION, EMULSION INTRAVENOUS at 08:14

## 2020-11-11 RX ADMIN — PROPOFOL 20 MG: 10 INJECTION, EMULSION INTRAVENOUS at 08:04

## 2020-11-11 RX ADMIN — PROPOFOL 50 MCG/KG/MIN: 10 INJECTION, EMULSION INTRAVENOUS at 07:58

## 2020-11-11 RX ADMIN — SODIUM CHLORIDE, POTASSIUM CHLORIDE, SODIUM LACTATE AND CALCIUM CHLORIDE: 600; 310; 30; 20 INJECTION, SOLUTION INTRAVENOUS at 07:56

## 2020-11-11 RX ADMIN — ONDANSETRON 4 MG: 2 INJECTION INTRAMUSCULAR; INTRAVENOUS at 08:09

## 2020-11-11 RX ADMIN — PROPOFOL 10 MG: 10 INJECTION, EMULSION INTRAVENOUS at 08:13

## 2020-11-11 RX ADMIN — CEFAZOLIN SODIUM 2 G: 2 INJECTION, SOLUTION INTRAVENOUS at 07:17

## 2020-11-11 SDOH — HEALTH STABILITY: MENTAL HEALTH: CURRENT SMOKER: 0

## 2020-11-11 ASSESSMENT — LIFESTYLE VARIABLES: TOBACCO_USE: 1

## 2020-11-11 ASSESSMENT — MIFFLIN-ST. JEOR: SCORE: 1653.97

## 2020-11-11 NOTE — ANESTHESIA POSTPROCEDURE EVALUATION
Patient: Juanita Aggarwal    Procedure(s):  PORT PLACEMENT    Diagnosis:Carcinoma of endometrium (H) [C54.1]    Anesthesia Type:  MAC    Note:  Anesthesia Post Evaluation    Patient location during evaluation: PACU  Patient participation: Able to fully participate in evaluation  Level of consciousness: awake and alert  Pain management: adequate  Airway patency: patent  Cardiovascular status: acceptable and hemodynamically stable  Respiratory status: acceptable and unassisted  Hydration status: acceptable  PONV: none             Last vitals:  Vitals:    11/11/20 0845 11/11/20 0900 11/11/20 0915   BP: 113/65 132/68 125/68   Pulse:  62 69   Resp: (!) 121 16 16   Temp: 37  C (98.6  F) 35.7  C (96.3  F)    SpO2: 98% 97% 97%         Electronically Signed By: Mathew Oswald DO  November 11, 2020  9:30 AM

## 2020-11-11 NOTE — PROCEDURES
Pre-Operative Diagnosis: Endometrial Cancer    Post-Operative Diagnosis: Same    Procedure: Right Internal Jugular Vein Port-A-Catheter Placement Under Ultrasound and Fluoroscopic Guidance     Surgeon: Herb Oswald MD    Assistant(s): N/A    Indication for Surgery: 70F with recent diagnosis of endometrial cancer who requires chemotherapy. Port indicated for chemotherapy and frequent lab draws.     Anesthesia: Local/MAC    Description of Procedure: The patient was brought to the operating room after obtaining informed consent. The patient was placed under MAC anesthesia. The patient's chest and neck were prepped and draped in a standard sterile fashion. It was difficult to feel the clavicle and sternal notch due to patient body habitus, so decision was to do an internal jugular port placement. The port and catheter were pre-prepared and flushed with heparinized saline. Local anesthetic was instilled. A sterile ultrasound set-up was used to identify a compressible non-pulsatile internal jugular vein. An introduce needle was used to access the right internal jugular vein under ultrasound guidance on first atempt with return of non-pulsatile, dark blood. A standard Seldinger technique was used to introduce a wire and subsequently sheath over a wire under fluoroscopic guidance into the internal jugular vein.  The right upper chest was anesthetized and the catheter was tunneled from the right chest wall through the small incision in the neck. The catheter was easily advanced without issue and under fluoroscopic guidance near the level of the the atrial-caval junction and the introducer sheath was removed. The catheter was easily withdrawn with dark venous blood, and easily flushed with heparinized saline without issue.  A subcuatenous pocket over the upper outer chest wall was created. The catheter was then clamped and trimmed to the appropriate length and attached to the port, which was secured with the locking  device on the catheter. The port was sutured in place with two prolene sutures. The port was then easily accessed across the skin with a Heuber needle, and the port was easily withdrawn and flushed. The incision and pocket were assessed for any bleeding and there was good hemostasis. The incision was closed in 3 layers with absorbable sutures. The incision was covered with derma-bond. The port was left unaccessed. A chest x-ray was ordered in recovery.     All counts were correct at the end of the procedure. An image of the port placement on fluoroscopic imaging was saved.     Herb Oswald MD  Thoracic Surgery  [facility]

## 2020-11-11 NOTE — ANESTHESIA PREPROCEDURE EVALUATION
Anesthesia Pre-Procedure Evaluation    Patient: Juanita Aggarwal   MRN: [id number] : 1950          Preoperative Diagnosis: Carcinoma of endometrium (H) [C54.1]    Procedure(s):  PORT PLACEMENT    Past Medical History:   Diagnosis Date     Arthritis      Cancer (H)      Cataract      Constipation      Depression      Diverticulosis      Fatty liver      Fibromyalgia      H/O degenerative disc disease      Restless legs syndrome      Scoliosis      Sleep apnea     does not use CPAP     Past Surgical History:   Procedure Laterality Date     DAVINCI HYSTERECTOMY TOTAL, BILATERAL SALPINGO-OOPHORECTMY, NODE DISSECTION, TUMOR STAGING, COMBINED Bilateral 10/15/2020    Procedure: ROBOTIC-ASSISTED TOTAL LAPAROSCOPIC HYSTERECTOMY, BILATERAL SALPINGO-OOPHORECTOMY, WASHING, BILATERAL SENTINEL LYMPHNODE BIOPSY WITH FIREFLY,;  Surgeon: Emily Gama MD;  Location:  OR     DILATION AND CURETTAGE       EYE SURGERY      laser surgery for detached retina     REPAIR LACERATION VAGINA N/A 10/15/2020    Procedure: Repair laceration vagina;  Surgeon: Emily Gama MD;  Location:  OR     wisdom teeth         Anesthesia Evaluation     . Pt has had prior anesthetic. Type: General    No history of anesthetic complications          ROS/MED HX    ENT/Pulmonary:     (+)sleep apnea, other ENT- TMJ disorder, tobacco use, Past use uses CPAP , . .    Neurologic:       Cardiovascular:     (+) ----. : . . . :. . Previous cardiac testing date:results:date: results:ECG reviewed date:10/14/20 results:NSR date: results:          METS/Exercise Tolerance:  >4 METS   Hematologic:         Musculoskeletal:   (+) arthritis,  -       GI/Hepatic:         Renal/Genitourinary:         Endo:     (+) Obesity (Class 3), .      Psychiatric:     (+) psychiatric history other (comment) and depression (Fibromyalgia)      Infectious Disease:         Malignancy:   (+) Malignancy History of Other  Other CA Endometrial status post         Other:      "                     Physical Exam  Normal systems: cardiovascular, pulmonary and dental    Airway   Mallampati: II  TM distance: >3 FB  Neck ROM: full    Dental     Cardiovascular       Pulmonary             Lab Results   Component Value Date    WBC 11.6 (H) 06/08/2016    HGB 13.3 06/08/2016    HCT 39.0 06/08/2016     06/08/2016     06/08/2016    POTASSIUM 3.9 06/08/2016    CHLORIDE 110 (H) 06/08/2016    CO2 21 06/08/2016    BUN 15 06/08/2016    CR 0.83 06/08/2016     (H) 06/08/2016    BENTON 9.0 06/08/2016    ALBUMIN 3.7 06/08/2016    PROTTOTAL 7.0 06/08/2016    ALT 24 06/08/2016    AST 21 06/08/2016    ALKPHOS 85 06/08/2016    BILITOTAL 0.5 06/08/2016    LIPASE 90 06/08/2016       Preop Vitals  BP Readings from Last 3 Encounters:   11/11/20 (!) 141/73   10/16/20 128/66   06/08/16 142/87    Pulse Readings from Last 3 Encounters:   10/16/20 55   06/08/16 59      Resp Readings from Last 3 Encounters:   11/11/20 16   10/16/20 16   06/08/16 18    SpO2 Readings from Last 3 Encounters:   11/11/20 96%   10/16/20 95%   06/08/16 99%      Temp Readings from Last 1 Encounters:   11/11/20 36.9  C (98.5  F) (Oral)    Ht Readings from Last 1 Encounters:   11/11/20 1.676 m (5' 6\")      Wt Readings from Last 1 Encounters:   11/11/20 111.2 kg (245 lb 3.2 oz)    Estimated body mass index is 39.58 kg/m  as calculated from the following:    Height as of this encounter: 1.676 m (5' 6\").    Weight as of this encounter: 111.2 kg (245 lb 3.2 oz).       Anesthesia Plan      History & Physical Review  History and physical reviewed and following examination; no interval change.    ASA Status:  2 .    NPO Status:  > 8 hours    Plan for MAC with Propofol induction. Reason for MAC:  Procedure to face, neck, head or breast  PONV prophylaxis:  Ondansetron (or other 5HT-3)    Patient was instructed to abstain from smoking on day of procedureThe patient is not a current smoker  and patient did not smoke on day of surgery "     Postoperative Care  Postoperative pain management:  IV analgesics and Multi-modal analgesia.      Consents  Anesthetic plan, risks, benefits and alternatives discussed with:  Patient..                 Mathew Oswald, DO

## 2020-11-11 NOTE — PROGRESS NOTES
Thoracic Surgery    Pt seen in recovery. Incision intact. No hematoma or swelling    CXR reviewed. Port in good position. No pneumothorax     Okay to discharge home once meets SDS dispo criteria.       Herb Oswald MD  Thoracic Surgeon  [facility]

## 2020-11-11 NOTE — ANESTHESIA POSTPROCEDURE EVALUATION
Patient: Juanita Aggarwal    Procedure(s):  PORT PLACEMENT    Diagnosis:Carcinoma of endometrium (H) [C54.1]  Diagnosis Additional Information: No value filed.    Anesthesia Type:  MAC    Note:  Anesthesia Post Evaluation    Patient location during evaluation: PACU  Patient participation: Able to fully participate in evaluation  Level of consciousness: awake and alert  Pain management: satisfactory to patient  Airway patency: patent  Cardiovascular status: hemodynamically stable  Respiratory status: acceptable and unassisted  Hydration status: balanced  PONV: none     Anesthetic complications: None          Last vitals:  Vitals:    11/11/20 0900 11/11/20 0915 11/11/20 0930   BP: 132/68 125/68 132/78   Pulse: 62 69 58   Resp: 16 16 16   Temp: 35.7  C (96.3  F)     SpO2: 97% 97% 96%         Electronically Signed By: Bradley Sanchez MD  November 11, 2020  4:48 PM

## 2020-11-11 NOTE — OR NURSING
PNDS criteria met.  Discharge instructions reviewed with Ms. Aggarwal; verbalizes understanding of discharge instructions.  Awaiting [facility].

## 2020-11-11 NOTE — ANESTHESIA CARE TRANSFER NOTE
Patient: Juanita Aggarwal    Procedure(s):  PORT PLACEMENT    Diagnosis: Carcinoma of endometrium (H) [C54.1]  Diagnosis Additional Information: No value filed.    Anesthesia Type:   MAC     Note:  Airway :Nasal Cannula  Patient transferred to:PACU  Comments: At end of procedure, spontaneous respirations, patient alert to voice, able to follow commands. Oxygen via nasal cannula at 3 liters per minute to PACU. Oxygen tubing connected to wall O2 in PACU, SpO2, NiBP, and EKG monitors and alarms on and functioning, Sherri Hugger warmer connected to patient gown, report on patient's clinical status given to PACU RN, RN questions answered.Handoff Report: Identifed the Patient, Identified the Reponsible Provider, Reviewed the pertinent medical history, Discussed the surgical course, Reviewed Intra-OP anesthesia mangement and issues during anesthesia, Set expectations for post-procedure period and Allowed opportunity for questions and acknowledgement of understanding      Vitals: (Last set prior to Anesthesia Care Transfer)    CRNA VITALS  11/11/2020 0815 - 11/11/2020 0850      11/11/2020             Pulse:  74    Ht Rate:  75    SpO2:  95 %    Resp Rate (set):  10                Electronically Signed By: MARIE Brenstein CRNA  November 11, 2020  8:50 AM

## 2020-11-24 ENCOUNTER — TRANSFERRED RECORDS (OUTPATIENT)
Dept: HEALTH INFORMATION MANAGEMENT | Facility: CLINIC | Age: 70
End: 2020-11-24

## 2020-11-25 ENCOUNTER — HOSPITAL ENCOUNTER (OUTPATIENT)
Facility: CLINIC | Age: 70
End: 2020-11-25
Attending: RADIOLOGY | Admitting: RADIOLOGY
Payer: COMMERCIAL

## 2020-11-25 DIAGNOSIS — Z11.59 ENCOUNTER FOR SCREENING FOR OTHER VIRAL DISEASES: Primary | ICD-10-CM

## 2020-11-25 RX ORDER — NALOXONE HYDROCHLORIDE 0.4 MG/ML
.1-.4 INJECTION, SOLUTION INTRAMUSCULAR; INTRAVENOUS; SUBCUTANEOUS
Status: CANCELLED | OUTPATIENT
Start: 2020-11-25

## 2020-11-25 RX ORDER — FENTANYL CITRATE 50 UG/ML
25-50 INJECTION, SOLUTION INTRAMUSCULAR; INTRAVENOUS EVERY 5 MIN PRN
Status: CANCELLED | OUTPATIENT
Start: 2020-11-25

## 2020-11-25 RX ORDER — CLINDAMYCIN PHOSPHATE 900 MG/50ML
900 INJECTION, SOLUTION INTRAVENOUS
Status: CANCELLED | OUTPATIENT
Start: 2020-11-25

## 2020-11-25 RX ORDER — FLUMAZENIL 0.1 MG/ML
0.2 INJECTION, SOLUTION INTRAVENOUS
Status: CANCELLED | OUTPATIENT
Start: 2020-11-25

## 2020-11-28 DIAGNOSIS — Z11.59 ENCOUNTER FOR SCREENING FOR OTHER VIRAL DISEASES: ICD-10-CM

## 2020-11-28 LAB
SARS-COV-2 RNA SPEC QL NAA+PROBE: NORMAL
SPECIMEN SOURCE: NORMAL

## 2020-11-28 PROCEDURE — U0003 INFECTIOUS AGENT DETECTION BY NUCLEIC ACID (DNA OR RNA); SEVERE ACUTE RESPIRATORY SYNDROME CORONAVIRUS 2 (SARS-COV-2) (CORONAVIRUS DISEASE [COVID-19]), AMPLIFIED PROBE TECHNIQUE, MAKING USE OF HIGH THROUGHPUT TECHNOLOGIES AS DESCRIBED BY CMS-2020-01-R: HCPCS | Performed by: NURSE PRACTITIONER

## 2020-11-29 ENCOUNTER — HEALTH MAINTENANCE LETTER (OUTPATIENT)
Age: 70
End: 2020-11-29

## 2020-11-29 LAB
LABORATORY COMMENT REPORT: NORMAL
SARS-COV-2 RNA SPEC QL NAA+PROBE: NEGATIVE
SPECIMEN SOURCE: NORMAL

## 2020-12-04 ENCOUNTER — MEDICAL CORRESPONDENCE (OUTPATIENT)
Dept: HEALTH INFORMATION MANAGEMENT | Facility: CLINIC | Age: 70
End: 2020-12-04

## 2020-12-04 DIAGNOSIS — Z11.59 ENCOUNTER FOR SCREENING FOR OTHER VIRAL DISEASES: Primary | ICD-10-CM

## 2020-12-11 DIAGNOSIS — Z11.59 ENCOUNTER FOR SCREENING FOR OTHER VIRAL DISEASES: ICD-10-CM

## 2020-12-11 LAB
SARS-COV-2 RNA SPEC QL NAA+PROBE: NOT DETECTED
SPECIMEN SOURCE: NORMAL

## 2020-12-11 PROCEDURE — 99000 SPECIMEN HANDLING OFFICE-LAB: CPT | Performed by: PATHOLOGY

## 2020-12-11 PROCEDURE — U0003 INFECTIOUS AGENT DETECTION BY NUCLEIC ACID (DNA OR RNA); SEVERE ACUTE RESPIRATORY SYNDROME CORONAVIRUS 2 (SARS-COV-2) (CORONAVIRUS DISEASE [COVID-19]), AMPLIFIED PROBE TECHNIQUE, MAKING USE OF HIGH THROUGHPUT TECHNOLOGIES AS DESCRIBED BY CMS-2020-01-R: HCPCS | Mod: 90 | Performed by: PATHOLOGY

## 2020-12-14 RX ORDER — NALOXONE HYDROCHLORIDE 0.4 MG/ML
0.2 INJECTION, SOLUTION INTRAMUSCULAR; INTRAVENOUS; SUBCUTANEOUS
Status: CANCELLED | OUTPATIENT
Start: 2020-12-14

## 2020-12-14 RX ORDER — FLUMAZENIL 0.1 MG/ML
0.2 INJECTION, SOLUTION INTRAVENOUS
Status: CANCELLED | OUTPATIENT
Start: 2020-12-14

## 2020-12-14 RX ORDER — NICOTINE POLACRILEX 4 MG
15-30 LOZENGE BUCCAL
Status: CANCELLED | OUTPATIENT
Start: 2020-12-14

## 2020-12-14 RX ORDER — DEXTROSE MONOHYDRATE 25 G/50ML
25-50 INJECTION, SOLUTION INTRAVENOUS
Status: CANCELLED | OUTPATIENT
Start: 2020-12-14

## 2020-12-14 RX ORDER — LIDOCAINE 40 MG/G
CREAM TOPICAL
Status: CANCELLED | OUTPATIENT
Start: 2020-12-14

## 2020-12-14 RX ORDER — NALOXONE HYDROCHLORIDE 0.4 MG/ML
0.4 INJECTION, SOLUTION INTRAMUSCULAR; INTRAVENOUS; SUBCUTANEOUS
Status: CANCELLED | OUTPATIENT
Start: 2020-12-14

## 2020-12-14 RX ORDER — ACETAMINOPHEN 325 MG/1
650-975 TABLET ORAL
Status: CANCELLED | OUTPATIENT
Start: 2020-12-14

## 2020-12-14 RX ORDER — CLINDAMYCIN PHOSPHATE 900 MG/50ML
900 INJECTION, SOLUTION INTRAVENOUS
Status: CANCELLED | OUTPATIENT
Start: 2020-12-14

## 2020-12-14 RX ORDER — FENTANYL CITRATE 50 UG/ML
25-50 INJECTION, SOLUTION INTRAMUSCULAR; INTRAVENOUS EVERY 5 MIN PRN
Status: CANCELLED | OUTPATIENT
Start: 2020-12-14

## 2020-12-15 ENCOUNTER — HOSPITAL ENCOUNTER (OUTPATIENT)
Facility: CLINIC | Age: 70
Discharge: HOME | End: 2020-12-15
Attending: RADIOLOGY | Admitting: RADIOLOGY
Payer: COMMERCIAL

## 2020-12-15 ENCOUNTER — APPOINTMENT (OUTPATIENT)
Dept: INTERVENTIONAL RADIOLOGY/VASCULAR | Facility: CLINIC | Age: 70
End: 2020-12-15
Attending: RADIOLOGY
Payer: COMMERCIAL

## 2020-12-15 VITALS
DIASTOLIC BLOOD PRESSURE: 85 MMHG | HEART RATE: 101 BPM | BODY MASS INDEX: 37.04 KG/M2 | RESPIRATION RATE: 16 BRPM | SYSTOLIC BLOOD PRESSURE: 141 MMHG | WEIGHT: 230.5 LBS | HEIGHT: 66 IN | OXYGEN SATURATION: 96 % | TEMPERATURE: 97.8 F

## 2020-12-15 DIAGNOSIS — C54.1 ENDOMETRIAL CA (H): ICD-10-CM

## 2020-12-15 LAB
ERYTHROCYTE [DISTWIDTH] IN BLOOD BY AUTOMATED COUNT: 14 % (ref 10–15)
HCT VFR BLD AUTO: 33.4 % (ref 35–47)
HGB BLD-MCNC: 10.6 G/DL (ref 11.7–15.7)
INR PPP: 0.96 (ref 0.86–1.14)
MCH RBC QN AUTO: 27.3 PG (ref 26.5–33)
MCHC RBC AUTO-ENTMCNC: 31.7 G/DL (ref 31.5–36.5)
MCV RBC AUTO: 86 FL (ref 78–100)
PLATELET # BLD AUTO: 163 10E9/L (ref 150–450)
RBC # BLD AUTO: 3.88 10E12/L (ref 3.8–5.2)
WBC # BLD AUTO: 2.3 10E9/L (ref 4–11)

## 2020-12-15 PROCEDURE — 85027 COMPLETE CBC AUTOMATED: CPT | Performed by: RADIOLOGY

## 2020-12-15 PROCEDURE — 85610 PROTHROMBIN TIME: CPT | Performed by: RADIOLOGY

## 2020-12-15 PROCEDURE — 255N000002 HC RX 255 OP 636: Performed by: RADIOLOGY

## 2020-12-15 PROCEDURE — 999N000163 HC STATISTIC SIMPLE TUBE INSERTION/CHARGE, PORT, CATH, FISTULOGRAM

## 2020-12-15 PROCEDURE — 250N000011 HC RX IP 250 OP 636: Performed by: RADIOLOGY

## 2020-12-15 PROCEDURE — 36598 INJ W/FLUOR EVAL CV DEVICE: CPT

## 2020-12-15 RX ORDER — DEXTROSE MONOHYDRATE 25 G/50ML
25-50 INJECTION, SOLUTION INTRAVENOUS
Status: DISCONTINUED | OUTPATIENT
Start: 2020-12-15 | End: 2020-12-15 | Stop reason: HOSPADM

## 2020-12-15 RX ORDER — NALOXONE HYDROCHLORIDE 0.4 MG/ML
0.2 INJECTION, SOLUTION INTRAMUSCULAR; INTRAVENOUS; SUBCUTANEOUS
Status: DISCONTINUED | OUTPATIENT
Start: 2020-12-15 | End: 2020-12-15 | Stop reason: HOSPADM

## 2020-12-15 RX ORDER — NICOTINE POLACRILEX 4 MG
15-30 LOZENGE BUCCAL
Status: DISCONTINUED | OUTPATIENT
Start: 2020-12-15 | End: 2020-12-15 | Stop reason: HOSPADM

## 2020-12-15 RX ORDER — DIAZEPAM 5 MG
5 TABLET ORAL DAILY PRN
COMMUNITY
Start: 2020-10-29

## 2020-12-15 RX ORDER — NALOXONE HYDROCHLORIDE 0.4 MG/ML
.1-.4 INJECTION, SOLUTION INTRAMUSCULAR; INTRAVENOUS; SUBCUTANEOUS
Status: DISCONTINUED | OUTPATIENT
Start: 2020-12-15 | End: 2020-12-15 | Stop reason: HOSPADM

## 2020-12-15 RX ORDER — IOPAMIDOL 612 MG/ML
50 INJECTION, SOLUTION INTRAVASCULAR ONCE
Status: COMPLETED | OUTPATIENT
Start: 2020-12-15 | End: 2020-12-15

## 2020-12-15 RX ORDER — NALOXONE HYDROCHLORIDE 0.4 MG/ML
0.4 INJECTION, SOLUTION INTRAMUSCULAR; INTRAVENOUS; SUBCUTANEOUS
Status: DISCONTINUED | OUTPATIENT
Start: 2020-12-15 | End: 2020-12-15 | Stop reason: HOSPADM

## 2020-12-15 RX ORDER — FLUMAZENIL 0.1 MG/ML
0.2 INJECTION, SOLUTION INTRAVENOUS
Status: DISCONTINUED | OUTPATIENT
Start: 2020-12-15 | End: 2020-12-15 | Stop reason: HOSPADM

## 2020-12-15 RX ORDER — ACETAMINOPHEN 500 MG
1000 TABLET ORAL EVERY 6 HOURS PRN
COMMUNITY

## 2020-12-15 RX ORDER — LORAZEPAM 0.5 MG/1
0.5 TABLET ORAL EVERY 4 HOURS PRN
COMMUNITY
Start: 2020-11-07

## 2020-12-15 RX ORDER — FENTANYL CITRATE 50 UG/ML
25-50 INJECTION, SOLUTION INTRAMUSCULAR; INTRAVENOUS EVERY 5 MIN PRN
Status: DISCONTINUED | OUTPATIENT
Start: 2020-12-15 | End: 2020-12-15 | Stop reason: HOSPADM

## 2020-12-15 RX ORDER — OLANZAPINE 2.5 MG/1
2.5 TABLET, FILM COATED ORAL DAILY
COMMUNITY
Start: 2020-11-06

## 2020-12-15 RX ORDER — LIDOCAINE 40 MG/G
CREAM TOPICAL
Status: DISCONTINUED | OUTPATIENT
Start: 2020-12-15 | End: 2020-12-15 | Stop reason: HOSPADM

## 2020-12-15 RX ORDER — CLINDAMYCIN PHOSPHATE 900 MG/50ML
900 INJECTION, SOLUTION INTRAVENOUS
Status: DISCONTINUED | OUTPATIENT
Start: 2020-12-15 | End: 2020-12-15 | Stop reason: HOSPADM

## 2020-12-15 RX ADMIN — HEPARIN SODIUM (PORCINE) LOCK FLUSH IV SOLN 100 UNIT/ML 500 UNITS: 100 SOLUTION at 10:46

## 2020-12-15 RX ADMIN — IOPAMIDOL 7 ML: 612 INJECTION, SOLUTION INTRAVENOUS at 10:51

## 2020-12-15 ASSESSMENT — MIFFLIN-ST. JEOR: SCORE: 1587.29

## 2020-12-15 NOTE — PROGRESS NOTES
Care Suites Admission Nursing Note    Patient Information  Name: Juanita Aggarwal  Age: 70 year old  Reason for admission: port check  Care Suites arrival time: 0800    Visitor Information  Name: N/A  Informed of visitor restrictions: N/A  1 visitor allowed per patient   Visitor must screen negative for COVID symptoms   Visitor must wear a mask  Waiting rooms closed to visitors    Patient Admission/Assessment   Pre-procedure assessment complete: Yes  If abnormal assessment/labs, provider notified: N/A  NPO: Yes  Medications held per instructions/orders: Yes  Consent: obtained  If applicable, pregnancy test status: deferred  Patient oriented to room: Yes  Education/questions answered: Yes  Plan/other: procedure as scheduled    Discharge Planning  Discharge name/phone number: Peggy (patient declined to give phone number)  Overnight post sedation caregiver: N/A - Emily LOPEZ notified.  Discharge location: home    DELPHINE Velarde RN

## 2020-12-15 NOTE — PROGRESS NOTES
No sedation given during procedure. Pt requests copy of Dr Patel's note. Per note - info is available thru My Chart. Pt informed & satisfied. Pt discharged per ambulatory to private vehicle. All personal belongings taken with pt.

## 2020-12-15 NOTE — PROCEDURES
"[facility]    Procedure: Port evaluation    Date/Time: 12/15/2020 11:10 AM  Performed by: Edilson Carson MD  Authorized by: Edilson Carson MD     UNIVERSAL PROTOCOL   Site Marked: NA  Prior Images Obtained and Reviewed:  Yes  Required items: Required blood products, implants, devices and special equipment available    Patient identity confirmed:  Verbally with patient, arm band, provided demographic data and hospital-assigned identification number  Patient was reevaluated immediately before administering moderate or deep sedation or anesthesia  Confirmation Checklist:  Patient's identity using two indicators, relevant allergies, procedure was appropriate and matched the consent or emergent situation and correct equipment/implants were available  Time out: Immediately prior to the procedure a time out was called    Universal Protocol: the Joint Commission Universal Protocol was followed    Preparation: Patient was prepped and draped in usual sterile fashion    ESBL (mL):  1         ANESTHESIA    Anesthesia: Local infiltration  Local Anesthetic:  Lidocaine 1% without epinephrine      SEDATION    Patient Sedated: No    See dictated procedure note for full details.  Findings: Right internal jugular port accessed.  Port has not flipped, though angled laterally where face to access port is facing more lateral than anterior.  \"X\" made on skin where access could be attempted.  Heparinized.     Specimens: none    Complications: None    Condition: Stable    PROCEDURE   Patient Tolerance:  Patient tolerated the procedure well with no immediate complications    Length of time physician/provider present for 1:1 monitoring during sedation: 0      "

## 2020-12-15 NOTE — IR NOTE
"Interventional Radiology Intra-procedural Nursing Note    Patient Name: Juanita Aggarwal  Medical Record Number: [id number]  Today's Date: December 15, 2020    Start Time:   End of procedure time:   Procedure: port check  Report given to: Care suites  Time pt departs:    : n/a    Other Notes:   No sedation/medication was given.    Port is functional and heparinized before de-accessing.    See Dr. Carson's note.    Port was accessed successfully with +flush and blood return with a 1.5\" needle at the lower lateral region of the port, from an angle. The port is sitting tilted laterally. Access point was marked with a marker. Recommend accessing with 1\" or 1.5\" needle.    Qian Rivas RN    "

## 2021-03-11 ENCOUNTER — MEDICAL CORRESPONDENCE (OUTPATIENT)
Dept: HEALTH INFORMATION MANAGEMENT | Facility: CLINIC | Age: 71
End: 2021-03-11

## 2021-03-11 ENCOUNTER — HOSPITAL ENCOUNTER (OUTPATIENT)
Dept: LAB | Facility: CLINIC | Age: 71
Discharge: HOME | End: 2021-03-11
Attending: NURSE PRACTITIONER | Admitting: NURSE PRACTITIONER
Payer: COMMERCIAL

## 2021-03-11 PROCEDURE — 88341 IMHCHEM/IMCYTCHM EA ADD ANTB: CPT | Mod: TC | Performed by: NURSE PRACTITIONER

## 2021-03-11 PROCEDURE — 88342 IMHCHEM/IMCYTCHM 1ST ANTB: CPT | Mod: TC | Performed by: NURSE PRACTITIONER

## 2021-03-11 PROCEDURE — 999N001021 HC STATISTIC H-SPECIAL HANDLING: Performed by: NURSE PRACTITIONER

## 2021-03-11 PROCEDURE — 999N001020 HC STATISTIC H-SEND OUTS PREP: Performed by: NURSE PRACTITIONER

## 2021-03-19 DIAGNOSIS — C19 COLORECTAL CANCER (H): Primary | ICD-10-CM

## 2021-03-19 PROCEDURE — G0452 MOLECULAR PATHOLOGY INTERPR: HCPCS | Mod: 26 | Performed by: PATHOLOGY

## 2021-03-19 PROCEDURE — 81288 MLH1 GENE: CPT | Performed by: NURSE PRACTITIONER

## 2021-03-31 LAB — COPATH REPORT: NORMAL

## 2021-05-27 ENCOUNTER — APPOINTMENT (OUTPATIENT)
Dept: URBAN - METROPOLITAN AREA CLINIC 256 | Age: 71
Setting detail: DERMATOLOGY
End: 2021-05-27

## 2021-05-27 VITALS — WEIGHT: 240 LBS | RESPIRATION RATE: 16 BRPM | HEIGHT: 66 IN

## 2021-05-27 DIAGNOSIS — L82.1 OTHER SEBORRHEIC KERATOSIS: ICD-10-CM

## 2021-05-27 DIAGNOSIS — D18.0 HEMANGIOMA: ICD-10-CM

## 2021-05-27 DIAGNOSIS — D17 BENIGN LIPOMATOUS NEOPLASM: ICD-10-CM

## 2021-05-27 DIAGNOSIS — L57.8 OTHER SKIN CHANGES DUE TO CHRONIC EXPOSURE TO NONIONIZING RADIATION: ICD-10-CM

## 2021-05-27 DIAGNOSIS — D22 MELANOCYTIC NEVI: ICD-10-CM

## 2021-05-27 DIAGNOSIS — L81.4 OTHER MELANIN HYPERPIGMENTATION: ICD-10-CM

## 2021-05-27 PROBLEM — D17.23 BENIGN LIPOMATOUS NEOPLASM OF SKIN AND SUBCUTANEOUS TISSUE OF RIGHT LEG: Status: ACTIVE | Noted: 2021-05-27

## 2021-05-27 PROBLEM — D18.01 HEMANGIOMA OF SKIN AND SUBCUTANEOUS TISSUE: Status: ACTIVE | Noted: 2021-05-27

## 2021-05-27 PROBLEM — D22.5 MELANOCYTIC NEVI OF TRUNK: Status: ACTIVE | Noted: 2021-05-27

## 2021-05-27 PROCEDURE — OTHER COUNSELING: OTHER

## 2021-05-27 PROCEDURE — 99203 OFFICE O/P NEW LOW 30 MIN: CPT

## 2021-05-27 ASSESSMENT — LOCATION DETAILED DESCRIPTION DERM
LOCATION DETAILED: MIDDLE STERNUM
LOCATION DETAILED: RIGHT SUPERIOR LATERAL UPPER BACK
LOCATION DETAILED: RIGHT SUPERIOR UPPER BACK
LOCATION DETAILED: RIGHT INFERIOR UPPER BACK
LOCATION DETAILED: RIGHT ANTERIOR PROXIMAL THIGH

## 2021-05-27 ASSESSMENT — LOCATION SIMPLE DESCRIPTION DERM
LOCATION SIMPLE: RIGHT BACK
LOCATION SIMPLE: RIGHT THIGH
LOCATION SIMPLE: CHEST
LOCATION SIMPLE: RIGHT UPPER BACK

## 2021-05-27 ASSESSMENT — LOCATION ZONE DERM
LOCATION ZONE: TRUNK
LOCATION ZONE: LEG

## 2021-09-19 ENCOUNTER — HEALTH MAINTENANCE LETTER (OUTPATIENT)
Age: 71
End: 2021-09-19

## 2022-01-09 ENCOUNTER — HEALTH MAINTENANCE LETTER (OUTPATIENT)
Age: 72
End: 2022-01-09

## 2022-07-17 ENCOUNTER — NURSE TRIAGE (OUTPATIENT)
Dept: NURSING | Facility: CLINIC | Age: 72
End: 2022-07-17

## 2022-07-18 NOTE — TELEPHONE ENCOUNTER
Connected with  staff they will advise patient to go to ER as previously advised in triage notes.    Lucy Quiñones RN

## 2022-07-18 NOTE — TELEPHONE ENCOUNTER
Called patient back and recommended and encouraged ER visit today.  Will cancel office visit for tomorrow.  Patient was hesitant for ER visit, but encouraged her to be seen in ER.    Lucy Quiñones RN

## 2022-07-18 NOTE — TELEPHONE ENCOUNTER
Triage Call:    Patient called to report intermittent chest pain.  She is being treated for cancer, scheduled for second chemotherapy session tomorrow and scheduled for urinary stent placement on 7/20/22.  She is inquiring if able to have an EKG tomorrow.  She reports she last had chest pain on 7/15/22 and it lasted for about an hour.  She reports left side chest pain and pressure on right side.  Patient reports she also had weakness and pain of left arm at time of chest pain.  Patient reports she took an ativan and went to sleep.  She denies chest pain right now, diaphoretic, difficulty breathing, weakness, or abnormal heart rate.      According to the protocol, patient should go to ED now.  Care advice given. Patient verbalizes understanding and declines disposition.  She reports she would like to just have one trip tomorrow.  Patient plans to go to clinic tomorrow and request EKG or go to urgent care.    Sandy Tomlinson RN  07/17/22 8:36 PM  [facility] Nurse Advisor    COVID 19 Nurse Triage Plan/Patient Instructions    Please be aware that novel coronavirus (COVID-19) may be circulating in the community. If you develop symptoms such as fever, cough, or SOB or if you have concerns about the presence of another infection including coronavirus (COVID-19), please contact your health care provider or visit [email or web address].     Disposition/Instructions    ED Visit recommended. Follow protocol based instructions.     Bring Your Own Device:  Please also bring your smart device(s) (smart phones, tablets, laptops) and their charging cables for your personal use and to communicate with your care team during your visit.    Thank you for taking steps to prevent the spread of this virus.  o Limit your contact with others.  o Wear a simple mask to cover your cough.  o Wash your hands well and often.    Resources    [facility]: About COVID-19: [email or web address]    CDC: What to Do If  "You're Sick: www.cdc.gov/coronavirus/2019-ncov/about/steps-when-sick.html    CDC: Ending Home Isolation: www.cdc.gov/coronavirus/2019-ncov/hcp/disposition-in-home-patients.html     CDC: Caring for Someone: www.cdc.gov/coronavirus/2019-ncov/if-you-are-sick/care-for-someone.html     [facility]: Interim Guidance for Hospital Discharge to Home: www.health.[facility].[place]./diseases/coronavirus/hcp/hospdischarge.pdf    [facility] clinical trials (COVID-19 research studies): [email or web address]     Below are the COVID-19 hotlines at the [place] [facility] ([facility]). Interpreters are available.   o For health questions: Call 391-498-1423 or 1-823.470.6606 (7 a.m. to 7 p.m.)  o For questions about schools and childcare: Call 969-386-5972 or 1-430.660.8676 (7 a.m. to 7 p.m.)     Reason for Disposition    [1] Chest pain (or \"angina\") comes and goes AND [2] is happening more often (increasing in frequency) or getting worse (increasing in severity) (Exception: chest pains that last only a few seconds)    Additional Information    Negative: SEVERE difficulty breathing (e.g., struggling for each breath, speaks in single words)    Negative: Difficult to awaken or acting confused (e.g., disoriented, slurred speech)    Negative: Shock suspected (e.g., cold/pale/clammy skin, too weak to stand, low BP, rapid pulse)    Negative: Passed out (i.e., fainted, collapsed and was not responding)    Negative: [1] Chest pain lasts > 5 minutes AND [2] age > 44    Negative: [1] Chest pain lasts > 5 minutes AND [2] age > 30 AND [3] one or more cardiac risk factors (e.g., diabetes, high blood pressure, high cholesterol, smoker, or strong family history of heart disease)    Negative: [1] Chest pain lasts > 5 minutes AND [2] history of heart disease (i.e., angina, heart attack, heart failure, bypass surgery, takes nitroglycerin)    Negative: [1] Chest pain lasts > 5 minutes AND [2] described as crushing, pressure-like, " or heavy    Negative: Heart beating < 50 beats per minute OR > 140 beats per minute    Negative: Visible sweat on face or sweat dripping down face    Negative: Sounds like a life-threatening emergency to the triager    Negative: Followed a chest injury    Negative: SEVERE chest pain    Protocols used: CHEST PAIN-A-AH

## 2022-11-21 ENCOUNTER — HEALTH MAINTENANCE LETTER (OUTPATIENT)
Age: 72
End: 2022-11-21

## 2022-12-25 ENCOUNTER — HEALTH MAINTENANCE LETTER (OUTPATIENT)
Age: 72
End: 2022-12-25

## 2023-11-26 ENCOUNTER — HEALTH MAINTENANCE LETTER (OUTPATIENT)
Age: 73
End: 2023-11-26

## (undated) DEVICE — GOWN IMPERVIOUS ZONED LG

## (undated) DEVICE — SYR 03ML LL W/O NDL 309657

## (undated) DEVICE — TUBING CONMED AIRSEAL SMOKE EVAC INSUFFLATION ASM-EVAC

## (undated) DEVICE — PACK DAVINCI GYN SMA15GDFS1

## (undated) DEVICE — SOL NACL 0.9% IRRIG 1000ML BOTTLE 2F7124

## (undated) DEVICE — DAVINCI XI SEAL UNIVERSAL 5-8MM 470361

## (undated) DEVICE — ESU ELEC BLADE 2.75" COATED/INSULATED E1455

## (undated) DEVICE — NDL 25GA 1.5" 305127

## (undated) DEVICE — ESU PENCIL W/SMOKE EVAC CVPLP2000

## (undated) DEVICE — TUBING IV EXTENSION SET ANESTHESIA 34" MLL 2C6227

## (undated) DEVICE — COVER ULTRASOUND PROBE 6X48" PC1290

## (undated) DEVICE — POUCH TISSUE RETRIEVAL 15MM 6.00" INTRO TRS190SB2

## (undated) DEVICE — RX SURGIFLO HEMOSTATIC MATRIX W/THROMBIN 8ML 2994

## (undated) DEVICE — DAVINCI HOT SHEARS TIP COVER  400180

## (undated) DEVICE — SU VICRYL 4-0 PS-2 18" UND J496H

## (undated) DEVICE — SU VICRYL 3-0 SH 27" J316H

## (undated) DEVICE — SOL BENZOIN 0.5OZ

## (undated) DEVICE — SPONGE RAY-TEC 4X4" 7317

## (undated) DEVICE — SYR 20ML LL W/O NDL 302830

## (undated) DEVICE — PREP CHLORAPREP W/ORANGE TINT 10.5ML 930715

## (undated) DEVICE — SU MONOCRYL 4-0 PS-2 18" UND Y496G

## (undated) DEVICE — DAVINCI XI DRAPE ARM 470015

## (undated) DEVICE — SOL NACL 0.9% INJ 250ML BAG 2B1322Q

## (undated) DEVICE — SU VICRYL 2-0 SH 27" J317H

## (undated) DEVICE — NDL INSUFFLATION 13GA 120MM C2201

## (undated) DEVICE — SYR 10ML FINGER CONTROL W/O NDL 309695

## (undated) DEVICE — SUCTION CANISTER MEDIVAC LINER 3000ML W/LID 65651-530

## (undated) DEVICE — DECANTER BAG 2002S

## (undated) DEVICE — SU PDS II 0 CT-2 27" Z334H

## (undated) DEVICE — DAVINCI XI OBTURATOR BLADELESS 8MM 470359

## (undated) DEVICE — KIT PATIENT POSITIONING PIGAZZI LATEX FREE 40580

## (undated) DEVICE — PACK MINOR SBA15MIFSE

## (undated) DEVICE — NDL SPINAL 22GA 3.5" QUINCKE 405181

## (undated) DEVICE — SPONGE LAP 18X18" X8435

## (undated) DEVICE — DRAPE IOBAN INCISE 23X17" 6650EZ

## (undated) DEVICE — SOL WATER IRRIG 1000ML BOTTLE 2F7114

## (undated) DEVICE — DAVINCI XI DRAPE COLUMN 470341

## (undated) DEVICE — GLOVE BIOGEL PI ULTRATOUCH G SZ 6.5 42165

## (undated) DEVICE — DRAPE LAP TRANSVERSE 29421

## (undated) DEVICE — GLOVE PROTEXIS W/NEU-THERA 7.5  2D73TE75

## (undated) DEVICE — DRAPE CV SPLIT II 147X106" 9158

## (undated) DEVICE — NDL 19GA 1.5"

## (undated) DEVICE — DRAPE SHEET REV FOLD 3/4 9349

## (undated) DEVICE — SOL NACL 0.9% INJ 1000ML BAG 2B1324X

## (undated) DEVICE — DRSG STERI STRIP 1X5" R1548

## (undated) DEVICE — ENDO TROCAR CONMED AIRSEAL BLADELESS 08X120MM IAS8-120LP

## (undated) DEVICE — SU PROLENE 3-0 SHDA 36" 8522H

## (undated) DEVICE — LINEN TOWEL PACK X5 5464

## (undated) DEVICE — SUCTION IRR STRYKERFLOW II W/TIP 250-070-520

## (undated) DEVICE — SYR 10ML LL W/O NDL

## (undated) DEVICE — DRAPE COVER C-ARM SEAMLESS SNAP-KAP 03-KP26 LATEX FREE

## (undated) DEVICE — ESU GROUND PAD UNIVERSAL W/O CORD

## (undated) DEVICE — DECANTER VIAL 2006S

## (undated) RX ORDER — PROPOFOL 10 MG/ML
INJECTION, EMULSION INTRAVENOUS
Status: DISPENSED
Start: 2020-10-15

## (undated) RX ORDER — CEFAZOLIN SODIUM 2 G/100ML
INJECTION, SOLUTION INTRAVENOUS
Status: DISPENSED
Start: 2020-11-11

## (undated) RX ORDER — BUPIVACAINE HYDROCHLORIDE 5 MG/ML
INJECTION, SOLUTION EPIDURAL; INTRACAUDAL
Status: DISPENSED
Start: 2020-11-11

## (undated) RX ORDER — PROPOFOL 10 MG/ML
INJECTION, EMULSION INTRAVENOUS
Status: DISPENSED
Start: 2020-11-11

## (undated) RX ORDER — LIDOCAINE HYDROCHLORIDE 20 MG/ML
INJECTION, SOLUTION EPIDURAL; INFILTRATION; INTRACAUDAL; PERINEURAL
Status: DISPENSED
Start: 2020-11-11

## (undated) RX ORDER — HEPARIN SODIUM 1000 [USP'U]/ML
INJECTION, SOLUTION INTRAVENOUS; SUBCUTANEOUS
Status: DISPENSED
Start: 2020-10-15

## (undated) RX ORDER — HEPARIN SODIUM (PORCINE) LOCK FLUSH IV SOLN 100 UNIT/ML 100 UNIT/ML
SOLUTION INTRAVENOUS
Status: DISPENSED
Start: 2020-12-15

## (undated) RX ORDER — FENTANYL CITRATE 50 UG/ML
INJECTION, SOLUTION INTRAMUSCULAR; INTRAVENOUS
Status: DISPENSED
Start: 2020-11-11

## (undated) RX ORDER — BUPIVACAINE HYDROCHLORIDE AND EPINEPHRINE 5; 5 MG/ML; UG/ML
INJECTION, SOLUTION EPIDURAL; INTRACAUDAL; PERINEURAL
Status: DISPENSED
Start: 2020-10-15

## (undated) RX ORDER — CIPROFLOXACIN 2 MG/ML
INJECTION, SOLUTION INTRAVENOUS
Status: DISPENSED
Start: 2020-10-15

## (undated) RX ORDER — HEPARIN SODIUM (PORCINE) LOCK FLUSH IV SOLN 100 UNIT/ML 100 UNIT/ML
SOLUTION INTRAVENOUS
Status: DISPENSED
Start: 2020-11-11

## (undated) RX ORDER — HYDROMORPHONE HYDROCHLORIDE 1 MG/ML
INJECTION, SOLUTION INTRAMUSCULAR; INTRAVENOUS; SUBCUTANEOUS
Status: DISPENSED
Start: 2020-10-15

## (undated) RX ORDER — ACETAMINOPHEN 325 MG/1
TABLET ORAL
Status: DISPENSED
Start: 2020-10-15

## (undated) RX ORDER — FENTANYL CITRATE 0.05 MG/ML
INJECTION, SOLUTION INTRAMUSCULAR; INTRAVENOUS
Status: DISPENSED
Start: 2020-10-15

## (undated) RX ORDER — ONDANSETRON 2 MG/ML
INJECTION INTRAMUSCULAR; INTRAVENOUS
Status: DISPENSED
Start: 2020-11-11

## (undated) RX ORDER — HEPARIN SODIUM 1000 [USP'U]/ML
INJECTION, SOLUTION INTRAVENOUS; SUBCUTANEOUS
Status: DISPENSED
Start: 2020-11-11

## (undated) RX ORDER — KETOROLAC TROMETHAMINE 15 MG/ML
INJECTION, SOLUTION INTRAMUSCULAR; INTRAVENOUS
Status: DISPENSED
Start: 2020-10-15

## (undated) RX ORDER — FENTANYL CITRATE 50 UG/ML
INJECTION, SOLUTION INTRAMUSCULAR; INTRAVENOUS
Status: DISPENSED
Start: 2020-10-15

## (undated) RX ORDER — INDOCYANINE GREEN AND WATER 25 MG
KIT INJECTION
Status: DISPENSED
Start: 2020-10-15

## (undated) RX ORDER — CLINDAMYCIN PHOSPHATE 900 MG/50ML
INJECTION, SOLUTION INTRAVENOUS
Status: DISPENSED
Start: 2020-12-15